# Patient Record
Sex: FEMALE | Race: BLACK OR AFRICAN AMERICAN | Employment: FULL TIME | URBAN - METROPOLITAN AREA
[De-identification: names, ages, dates, MRNs, and addresses within clinical notes are randomized per-mention and may not be internally consistent; named-entity substitution may affect disease eponyms.]

---

## 2017-03-08 ENCOUNTER — HOSPITAL ENCOUNTER (EMERGENCY)
Age: 47
Discharge: HOME | End: 2017-03-08
Attending: EMERGENCY MEDICINE

## 2017-03-08 VITALS
TEMPERATURE: 98.1 F | OXYGEN SATURATION: 100 % | RESPIRATION RATE: 16 BRPM | HEART RATE: 98 BPM | WEIGHT: 195 LBS | BODY MASS INDEX: 32.49 KG/M2 | HEIGHT: 65 IN | SYSTOLIC BLOOD PRESSURE: 140 MMHG | DIASTOLIC BLOOD PRESSURE: 90 MMHG

## 2017-03-08 DIAGNOSIS — B86 SCABIES: Primary | ICD-10-CM

## 2017-03-08 PROCEDURE — G0381 LEV 2 HOSP TYPE B ED VISIT: HCPCS

## 2017-03-08 RX ORDER — PERMETHRIN 50 MG/G
CREAM TOPICAL
Qty: 1 TUBE | Refills: 0 | Status: SHIPPED | OUTPATIENT
Start: 2017-03-08 | End: 2018-03-30 | Stop reason: ALTCHOICE

## 2017-03-08 ASSESSMENT — ENCOUNTER SYMPTOMS
NAUSEA: 0
COLOR CHANGE: 0
ABDOMINAL PAIN: 0
DIARRHEA: 0
VOMITING: 0
SHORTNESS OF BREATH: 0

## 2017-09-07 ENCOUNTER — HOSPITAL ENCOUNTER (OUTPATIENT)
Age: 47
Setting detail: SPECIMEN
Discharge: HOME | End: 2017-09-07
Payer: COMMERCIAL

## 2017-09-07 ENCOUNTER — OFFICE VISIT (OUTPATIENT)
Dept: INTERNAL MEDICINE | Age: 47
End: 2017-09-07
Payer: COMMERCIAL

## 2017-09-07 VITALS
HEIGHT: 65 IN | DIASTOLIC BLOOD PRESSURE: 69 MMHG | SYSTOLIC BLOOD PRESSURE: 128 MMHG | BODY MASS INDEX: 31.65 KG/M2 | WEIGHT: 190 LBS | HEART RATE: 59 BPM

## 2017-09-07 DIAGNOSIS — K21.9 GASTROESOPHAGEAL REFLUX DISEASE WITHOUT ESOPHAGITIS: ICD-10-CM

## 2017-09-07 DIAGNOSIS — J30.2 SEASONAL ALLERGIC RHINITIS, UNSPECIFIED ALLERGIC RHINITIS TRIGGER: ICD-10-CM

## 2017-09-07 DIAGNOSIS — D17.20 LIPOMA OF SHOULDER: ICD-10-CM

## 2017-09-07 DIAGNOSIS — E66.9 OBESITY (BMI 30-39.9): ICD-10-CM

## 2017-09-07 DIAGNOSIS — N91.2 AMENORRHEA: ICD-10-CM

## 2017-09-07 DIAGNOSIS — R14.0 ABDOMINAL BLOATING: Primary | ICD-10-CM

## 2017-09-07 LAB
ABSOLUTE EOS #: 0.2 K/UL (ref 0–0.4)
ABSOLUTE LYMPH #: 2.2 K/UL (ref 1–4.8)
ABSOLUTE MONO #: 0.5 K/UL (ref 0.1–1.2)
BASOPHILS # BLD: 1 %
BASOPHILS ABSOLUTE: 0 K/UL (ref 0–0.2)
DIFFERENTIAL TYPE: NORMAL
EOSINOPHILS RELATIVE PERCENT: 3 %
HCT VFR BLD CALC: 39.5 % (ref 36–46)
HEMOGLOBIN: 12.9 G/DL (ref 12–16)
LYMPHOCYTES # BLD: 31 %
MCH RBC QN AUTO: 29.4 PG (ref 26–34)
MCHC RBC AUTO-ENTMCNC: 32.8 G/DL (ref 31–37)
MCV RBC AUTO: 89.7 FL (ref 80–100)
MONOCYTES # BLD: 8 %
PDW BLD-RTO: 15.2 % (ref 12.5–15.4)
PLATELET # BLD: 267 K/UL (ref 140–450)
PLATELET ESTIMATE: NORMAL
PMV BLD AUTO: 9.4 FL (ref 6–12)
RBC # BLD: 4.4 M/UL (ref 4–5.2)
RBC # BLD: NORMAL 10*6/UL
SEG NEUTROPHILS: 57 %
SEGMENTED NEUTROPHILS ABSOLUTE COUNT: 4.1 K/UL (ref 1.8–7.7)
WBC # BLD: 7.1 K/UL (ref 3.5–11)
WBC # BLD: NORMAL 10*3/UL

## 2017-09-07 PROCEDURE — 36415 COLL VENOUS BLD VENIPUNCTURE: CPT

## 2017-09-07 PROCEDURE — 99213 OFFICE O/P EST LOW 20 MIN: CPT | Performed by: STUDENT IN AN ORGANIZED HEALTH CARE EDUCATION/TRAINING PROGRAM

## 2017-09-07 PROCEDURE — 85025 COMPLETE CBC W/AUTO DIFF WBC: CPT

## 2017-09-07 RX ORDER — PANTOPRAZOLE SODIUM 40 MG/1
40 TABLET, DELAYED RELEASE ORAL DAILY
Qty: 30 TABLET | Refills: 3 | Status: SHIPPED | OUTPATIENT
Start: 2017-09-07 | End: 2019-04-12 | Stop reason: ALTCHOICE

## 2017-09-07 ASSESSMENT — PATIENT HEALTH QUESTIONNAIRE - PHQ9
SUM OF ALL RESPONSES TO PHQ QUESTIONS 1-9: 0
SUM OF ALL RESPONSES TO PHQ9 QUESTIONS 1 & 2: 0
1. LITTLE INTEREST OR PLEASURE IN DOING THINGS: 0
1. LITTLE INTEREST OR PLEASURE IN DOING THINGS: 0
2. FEELING DOWN, DEPRESSED OR HOPELESS: 0
2. FEELING DOWN, DEPRESSED OR HOPELESS: 0
SUM OF ALL RESPONSES TO PHQ QUESTIONS 1-9: 0
SUM OF ALL RESPONSES TO PHQ9 QUESTIONS 1 & 2: 0

## 2019-04-08 ENCOUNTER — HOSPITAL ENCOUNTER (EMERGENCY)
Age: 49
Discharge: HOME | End: 2019-04-08
Attending: EMERGENCY MEDICINE

## 2019-04-08 VITALS
DIASTOLIC BLOOD PRESSURE: 79 MMHG | SYSTOLIC BLOOD PRESSURE: 146 MMHG | RESPIRATION RATE: 14 BRPM | OXYGEN SATURATION: 100 % | HEART RATE: 60 BPM | TEMPERATURE: 97.9 F | BODY MASS INDEX: 30.82 KG/M2 | HEIGHT: 65 IN | WEIGHT: 185 LBS

## 2019-04-08 DIAGNOSIS — L03.012 PARONYCHIA OF FINGER OF LEFT HAND: Primary | ICD-10-CM

## 2019-04-08 PROCEDURE — G0382 LEV 3 HOSP TYPE B ED VISIT: HCPCS

## 2019-04-08 RX ORDER — CEPHALEXIN 500 MG/1
500 CAPSULE ORAL 4 TIMES DAILY
Qty: 28 CAPSULE | Refills: 0 | Status: SHIPPED | OUTPATIENT
Start: 2019-04-08 | End: 2019-04-12 | Stop reason: ALTCHOICE

## 2019-04-08 ASSESSMENT — ENCOUNTER SYMPTOMS
VOMITING: 0
COUGH: 0
NAUSEA: 0
COLOR CHANGE: 1
WHEEZING: 0
ABDOMINAL PAIN: 0

## 2019-04-08 ASSESSMENT — PAIN DESCRIPTION - LOCATION: LOCATION: FINGER (COMMENT WHICH ONE)

## 2019-04-08 ASSESSMENT — PAIN SCALES - GENERAL
PAINLEVEL_OUTOF10: 10
PAINLEVEL_OUTOF10: 5

## 2019-04-08 ASSESSMENT — PAIN DESCRIPTION - PAIN TYPE: TYPE: ACUTE PAIN

## 2019-04-08 ASSESSMENT — PAIN DESCRIPTION - ORIENTATION: ORIENTATION: LEFT

## 2019-04-08 ASSESSMENT — PAIN DESCRIPTION - ONSET: ONSET: SUDDEN

## 2019-04-08 ASSESSMENT — PAIN DESCRIPTION - FREQUENCY: FREQUENCY: CONTINUOUS

## 2019-04-08 ASSESSMENT — PAIN DESCRIPTION - DESCRIPTORS: DESCRIPTORS: THROBBING

## 2019-04-08 NOTE — ED PROVIDER NOTES
[address] [facility]     Emergency Department     Faculty Attestation    I performed a history and physical examination of the patient and discussed management with the resident. I reviewed the residents note and agree with the documented findings and plan of care. Any areas of disagreement are noted on the chart. I was personally present for the key portions of any procedures. I have documented in the chart those procedures where I was not present during the key portions. I have reviewed the emergency nurses triage note. I agree with the chief complaint, past medical history, past surgical history, allergies, medications, social and family history as documented unless otherwise noted below. For Physician Assistant/ Nurse Practitioner cases/documentation I have personally evaluated this patient and have completed at least one if not all key elements of the E/M (history, physical exam, and MDM). Additional findings are as noted. Right-hand-dominant, paronychia left middle finger.       Aki Veronica MD  04/08/19 [id number]

## 2019-04-08 NOTE — ED PROVIDER NOTES
[address] [facility]  ED  Emergency Department Encounter  Advanced Practice Provider     Pt Name: Delia Ryan  MRN: [id number]  [place] 1970  Date of evaluation: 19  PCP:  Divya Talbot MD    [address] [place] [address]       Chief Complaint   Patient presents with    Hand Pain     pt c/o pain to left middle finger since last night pt states she thinks she got  a paint chip or splinter in it a few days ago        HISTORY OF PRESENT ILLNESS  (Location/Symptom, Timing/Onset,Context/Setting, Quality, Duration, Modifying Factors, Severity.)      Delia Ryan is a 50 y.o. female who presents with swelling around the nailbed of the left long finger. Patient states that this started 24 hours ago. She states that she might have had a hangnail or may have gotten a piece of a paint chip underneath her nail but she thought that she got it all out. She is right-hand dominant. She has full range of motion. She denies any numbness or tingling. She has no history of diabetes. She denies any drainage. PAST MEDICAL /SURGICAL / SOCIAL / FAMILY HISTORY      has a past medical history of Abnormal Pap smear, Allergic, Allergic rhinitis, Anxiety, Depression, and Obesity (BMI 35.0-39.9 without comorbidity). has a past surgical history that includes  section; Tubal ligation; Breast biopsy; and Gynecologic cryosurgery. Social History     Socioeconomic History    Marital status:      Spouse name: Not on file    Number of children: Not on file    Years of education: Not on file    Highest education level: Not on file   Occupational History    Not on file   Social Needs    Financial resource strain: Not on file    Food insecurity:     Worry: Not on file     Inability: Not on file    Transportation needs:     Medical: Not on file     Non-medical: Not on file   Tobacco Use    Smoking status: Never Smoker    Smokeless tobacco: Never Used   Substance and Sexual Activity    Alcohol use:  No Alcohol/week: 0.0 oz    Drug use: No    Sexual activity: Yes   Lifestyle    Physical activity:     Days per week: Not on file     Minutes per session: Not on file    Stress: Not on file   Relationships    Social connections:     Talks on phone: Not on file     Gets together: Not on file     Attends [employer] service: Not on file     Active member of club or organization: Not on file     Attends meetings of clubs or organizations: Not on file     Relationship status: Not on file    Intimate partner violence:     Fear of current or ex partner: Not on file     Emotionally abused: Not on file     Physically abused: Not on file     Forced sexual activity: Not on file   Other Topics Concern    Not on file   Social History Narrative    Not on file       Family History   Problem Relation Age of Onset    Cancer Maternal Aunt 52        colon    Cancer Maternal Grandmother 54        breast    Diabetes Mother     Breast Cancer Neg Hx     Colon Cancer Neg Hx     Eclampsia Neg Hx     Hypertension Neg Hx     Ovarian Cancer Neg Hx      Labor Neg Hx     Spont Abortions Neg Hx     Stroke Neg Hx        Allergies:  Patient has no known allergies. Home Medications:  Prior to Admission medications    Medication Sig Start Date End Date Taking? Authorizing Provider   cephALEXin (KEFLEX) 500 MG capsule Take 1 capsule by mouth 4 times daily for 7 days 4/8/19 4/15/19 Yes Hallie Velazquez PA-C   cetirizine (ZYRTEC) 1 MG/ML syrup TAKE ONE and a Half TEASPOONFUL BY MOUTH DAILY 17   Jenna Retana MD   pantoprazole (PROTONIX) 40 MG tablet Take 1 tablet by mouth daily 17   Jenna Retana MD       patient's medication list has been reviewed as entered by the nursing staff. REVIEW OF SYSTEMS    (2-9 systems for level 4, 10 or more for level 5)      Review of Systems   Constitutional: Negative for chills and fever. Respiratory: Negative for cough and wheezing. Cardiovascular: Negative for chest pain. Gastrointestinal: Negative for abdominal pain, nausea and vomiting. Musculoskeletal: Negative for arthralgias and myalgias. Skin: Positive for color change. Negative for wound. PHYSICAL EXAM  (up to 7 for level 4, 8 or more for level 5)      INITIAL VITALS:  height is 5' 5\" (1.651 m) and weight is 185 lb (83.9 kg). Her oral temperature is 97.9 °F (36.6 °C). Her blood pressure is 146/79 (abnormal) and her pulse is 60. Her respiration is 14 and oxygen saturation is 100%. Physical Exam   Constitutional: She is oriented to person, place, and time. She appears well-developed and well-nourished. HENT:   Head: Normocephalic and atraumatic. Right Ear: External ear normal.   Left Ear: External ear normal.   Eyes: Right eye exhibits no discharge. Left eye exhibits no discharge. No scleral icterus. Neck: No tracheal deviation present. Pulmonary/Chest: Effort normal. No stridor. No respiratory distress. Musculoskeletal: Normal range of motion. She exhibits no edema. On the long finger there is a fluctuant area that appears consistent with a paronychia. There is no fluctuance to the pad of the finger. Full range of motion of the finger. Radial pulses palpable. There is some mild erythema to the dorsum of the finger to the distal end. Neurological: She is alert and oriented to person, place, and time. Coordination normal.   Skin: Skin is warm and dry. She is not diaphoretic. Psychiatric: She has a normal mood and affect.  Her behavior is normal.         DIFFERENTIAL  DIAGNOSIS       Paronychia, felon    PLAN (LABS / [place] / EKG):  Orders Placed This Encounter   Procedures    INCISION AND DRAINAGE       MEDICATIONS ORDERED:  Orders Placed This Encounter   Medications    cephALEXin (KEFLEX) 500 MG capsule     Sig: Take 1 capsule by mouth 4 times daily for 7 days     Dispense:  28 capsule     Refill:  0       Controlled Substances Monitoring:      DIAGNOSTIC RESULTS / [address] but occasionally words are mis-transcribed.)        Marylu Bolanos PA-C  04/08/19 [id number]

## 2019-04-12 PROBLEM — E66.811 CLASS 1 OBESITY WITH BODY MASS INDEX (BMI) OF 32.0 TO 32.9 IN ADULT: Status: ACTIVE | Noted: 2019-04-12

## 2019-04-12 PROBLEM — E66.9 CLASS 1 OBESITY WITH BODY MASS INDEX (BMI) OF 32.0 TO 32.9 IN ADULT: Status: ACTIVE | Noted: 2019-04-12

## 2020-03-09 ENCOUNTER — OFFICE VISIT (OUTPATIENT)
Dept: INTERNAL MEDICINE | Age: 50
End: 2020-03-09
Payer: COMMERCIAL

## 2020-03-09 VITALS
HEART RATE: 83 BPM | BODY MASS INDEX: 35.16 KG/M2 | WEIGHT: 211 LBS | HEIGHT: 65 IN | DIASTOLIC BLOOD PRESSURE: 77 MMHG | SYSTOLIC BLOOD PRESSURE: 132 MMHG

## 2020-03-09 PROCEDURE — 99214 OFFICE O/P EST MOD 30 MIN: CPT | Performed by: NURSE PRACTITIONER

## 2020-03-09 PROCEDURE — 99211 OFF/OP EST MAY X REQ PHY/QHP: CPT | Performed by: NURSE PRACTITIONER

## 2020-03-09 RX ORDER — MELOXICAM 15 MG/1
15 TABLET ORAL DAILY
Qty: 30 TABLET | Refills: 1 | Status: SHIPPED | OUTPATIENT
Start: 2020-03-09 | End: 2020-10-13

## 2020-03-09 ASSESSMENT — PATIENT HEALTH QUESTIONNAIRE - PHQ9
SUM OF ALL RESPONSES TO PHQ QUESTIONS 1-9: 1
1. LITTLE INTEREST OR PLEASURE IN DOING THINGS: 0
SUM OF ALL RESPONSES TO PHQ QUESTIONS 1-9: 1
2. FEELING DOWN, DEPRESSED OR HOPELESS: 1
SUM OF ALL RESPONSES TO PHQ9 QUESTIONS 1 & 2: 1

## 2020-03-09 NOTE — PROGRESS NOTES
Subjective:      Patient ID: Dulce Reddy is a 52 y.o. female. Hand Pain    Incident onset: onset 6-7 months ago. There was no injury mechanism. The pain is present in the left fingers, left hand, right fingers and right hand. The quality of the pain is described as aching and cramping. The pain does not radiate. The pain is at a severity of 5/10. The pain is moderate. The pain has been intermittent since the incident. Pertinent negatives include no chest pain, muscle weakness, numbness or tingling. Exacerbated by: repetitive movement at work. She has tried NSAIDs for the symptoms. The treatment provided mild relief. Mental Health Problem   The primary symptoms include dysphoric mood. Primary symptoms comment: anxiety. The current episode started more than 1 month ago. This is a recurrent problem. The onset of the illness is precipitated by emotional stress. The degree of incapacity that she is experiencing as a consequence of her illness is mild. Additional symptoms of the illness include insomnia and fatigue. She does not admit to suicidal ideas. She does not have a plan to commit suicide. She does not contemplate harming herself. She has not already injured self. She does not contemplate injuring another person. She has not already  injured another person. Risk factors: history of anxiety and depression. Review of Systems   Constitutional: Positive for fatigue. Cardiovascular: Negative for chest pain. Neurological: Negative for tingling and numbness. Psychiatric/Behavioral: Positive for dysphoric mood. The patient has insomnia. All other systems reviewed and are negative. Objective:   Physical Exam  Vitals signs and nursing note reviewed. Constitutional:       Appearance: Normal appearance. HENT:      Head: Normocephalic and atraumatic.       Right Ear: Tympanic membrane, ear canal and external ear normal.      Left Ear: Tympanic membrane, ear canal and external ear normal. Nose: Nose normal.      Mouth/Throat:      Mouth: Mucous membranes are moist.      Pharynx: Oropharynx is clear. Eyes:      Extraocular Movements: Extraocular movements intact. Conjunctiva/sclera: Conjunctivae normal.      Pupils: Pupils are equal, round, and reactive to light. Neck:      Musculoskeletal: Normal range of motion and neck supple. Cardiovascular:      Rate and Rhythm: Normal rate and regular rhythm. Pulmonary:      Effort: Pulmonary effort is normal.      Breath sounds: Normal breath sounds. Abdominal:      General: Bowel sounds are normal.      Palpations: Abdomen is soft. Musculoskeletal: Normal range of motion. Comments: Radial pulses equal and strong. ROM of hands and fingers WDL bilaterally.  strength equal bilaterally. No edema, effusion or increased warmth of joints. Skin:     General: Skin is warm and dry. Neurological:      General: No focal deficit present. Mental Status: She is alert and oriented to person, place, and time. Psychiatric:         Mood and Affect: Mood normal.         Behavior: Behavior normal.         Thought Content: Thought content normal.         Judgment: Judgment normal.         Assessment/Plan:      1. Bilateral hand pain  - meloxicam (MOBIC) 15 MG tablet; Take 1 tablet by mouth daily  Dispense: 30 tablet; Refill: 1    2. Screening for hyperlipidemia  - Lipid Panel; Future    3. Encounter for screening for diabetes mellitus  - Glucose, Fasting; Future  - Hemoglobin A1C; Future    4. Screening for human immunodeficiency virus  - HIV Screen; Future    5. Anxiety and depression  - sertraline (ZOLOFT) 50 MG tablet; Take 1 tablet by mouth daily  Dispense: 30 tablet;  Refill: DEANGELO Coto - CNP

## 2020-06-03 ENCOUNTER — TELEPHONE (OUTPATIENT)
Dept: INTERNAL MEDICINE | Age: 50
End: 2020-06-03

## 2020-06-03 NOTE — TELEPHONE ENCOUNTER
Patient called in requesting that the days she has for her FMLA be extended & would also like to be prescribed Zyrtec for her allergies. Pt would like a call back from clinical staff in regards to this please advise thank you!

## 2020-06-04 NOTE — TELEPHONE ENCOUNTER
PC to patient - scheduled patient for 6/8/2020 to discuss FMLA. Advised patient to complete labs prior to Monday so that Maxwell Hendrix can review with her and discuss medication at time of appointment.

## 2020-06-08 ENCOUNTER — VIRTUAL VISIT (OUTPATIENT)
Dept: INTERNAL MEDICINE | Age: 50
End: 2020-06-08
Payer: COMMERCIAL

## 2020-06-08 PROCEDURE — 99213 OFFICE O/P EST LOW 20 MIN: CPT | Performed by: NURSE PRACTITIONER

## 2020-06-08 RX ORDER — BUSPIRONE HYDROCHLORIDE 5 MG/1
5 TABLET ORAL 2 TIMES DAILY
Qty: 60 TABLET | Refills: 0 | Status: SHIPPED | OUTPATIENT
Start: 2020-06-08 | End: 2020-07-08

## 2020-06-19 ENCOUNTER — TELEPHONE (OUTPATIENT)
Dept: INTERNAL MEDICINE | Age: 50
End: 2020-06-19

## 2020-07-24 ENCOUNTER — HOSPITAL ENCOUNTER (OUTPATIENT)
Age: 50
Setting detail: SPECIMEN
Discharge: HOME | End: 2020-07-24
Payer: COMMERCIAL

## 2020-07-24 LAB
CHOLESTEROL/HDL RATIO: 2.6
CHOLESTEROL: 172 MG/DL
ESTIMATED AVERAGE GLUCOSE: 114 MG/DL
GLUCOSE FASTING: 79 MG/DL (ref 70–99)
HBA1C MFR BLD: 5.6 % (ref 4–6)
HDLC SERPL-MCNC: 66 MG/DL
HIV AG/AB: NONREACTIVE
LDL CHOLESTEROL: 97 MG/DL (ref 0–130)
TRIGL SERPL-MCNC: 44 MG/DL
VLDLC SERPL CALC-MCNC: NORMAL MG/DL (ref 1–30)

## 2020-07-27 LAB
2000687N OAK TREE IGE: <0.34 KU/L (ref 0–0.34)
ALLERGEN BERMUDA GRASS IGE: 0.63 KU/L (ref 0–0.34)
ALLERGEN BIRCH IGE: <0.34 KU/L (ref 0–0.34)
ALLERGEN COW MILK IGE: <0.34 KU/L (ref 0–0.34)
ALLERGEN DOG DANDER IGE: 0.38 KU/L (ref 0–0.34)
ALLERGEN GERMAN COCKROACH IGE: <0.34 KU/L (ref 0–0.34)
ALLERGEN HORMODENDRUM IGE: <0.34 KUL/L (ref 0–0.34)
ALLERGEN MOUSE EPITHELIA IGE: <0.34 KU/L (ref 0–0.34)
ALLERGEN PEANUT (F13) IGE: <0.34 KU/L (ref 0–0.34)
ALLERGEN PECAN TREE IGE: <0.34 KU/L (ref 0–0.34)
ALLERGEN PIGWEED ROUGH IGE: <0.34 KU/L (ref 0–0.34)
ALLERGEN SHEEP SORREL (W18) IGE: <0.34 KU/L (ref 0–0.34)
ALLERGEN TREE SYCAMORE: <0.34 KU/L (ref 0–0.34)
ALLERGEN WALNUT TREE IGE: <0.34 KU/L (ref 0–0.34)
ALLERGEN WHITE MULBERRY TREE, IGE: <0.34 KU/L (ref 0–0.34)
ALLERGEN, TREE, WHITE ASH IGE: <0.34 KU/L (ref 0–0.34)
ALTERNARIA ALTERNATA: <0.34 KU/L (ref 0–0.34)
ASPERGILLUS FUMIGATUS: <0.34 KU/L (ref 0–0.34)
CAT DANDER ANTIBODY: 10.8 KU/L (ref 0–0.34)
COTTONWOOD TREE: <0.34 KU/L (ref 0–0.34)
D. FARINAE: <0.34 KU/L (ref 0–0.34)
D. PTERONYSSINUS: <0.34 KU/L (ref 0–0.34)
ELM TREE: <0.34 KU/L (ref 0–0.34)
IGE: 75 IU/ML
MAPLE/BOXELDER TREE: <0.34 KU/L (ref 0–0.34)
MOUNTAIN CEDAR TREE: <0.34 KU/L (ref 0–0.34)
MUCOR RACEMOSUS: <0.34 KU/L (ref 0–0.34)
P. NOTATUM: <0.34 KU/L (ref 0–0.34)
RUSSIAN THISTLE: <0.34 KU/L (ref 0–0.34)
SHORT RAGWD(A ARTEMIS.) IGE: <0.34 KU/L (ref 0–0.34)
TIMOTHY GRASS: 4.74 KU/L (ref 0–0.34)

## 2020-07-31 ENCOUNTER — TELEPHONE (OUTPATIENT)
Dept: PEDIATRICS | Age: 50
End: 2020-07-31

## 2020-08-03 RX ORDER — CETIRIZINE HYDROCHLORIDE 10 MG/1
10 TABLET ORAL DAILY
Qty: 30 TABLET | Refills: 1 | Status: SHIPPED | OUTPATIENT
Start: 2020-08-03 | End: 2021-01-05 | Stop reason: SDUPTHER

## 2020-09-30 ENCOUNTER — VIRTUAL VISIT (OUTPATIENT)
Dept: INTERNAL MEDICINE | Age: 50
End: 2020-09-30
Payer: COMMERCIAL

## 2020-09-30 PROCEDURE — 99442 PR PHYS/QHP TELEPHONE EVALUATION 11-20 MIN: CPT | Performed by: INTERNAL MEDICINE

## 2020-09-30 NOTE — PROGRESS NOTES
Patient initiated a phone visit to discuss starting on weight loss medication. She has tried Adipex in the past and would like to go back on it. She has no other underlying hypertension or coronary artery disease. Patient advised to come in for a blood pressure check and counseling regarding Adipex. Labs reviewed. Patient also has not had a Pap smear or colonoscopy. We will discuss all this issues at the next visit. Attending Physician Statement  I have discussed the care of Adithya Castro, including pertinent history and exam findings,  with the resident. I have reviewed the key elements of all parts of the encounter with the resident. I agree with the assessment, plan and orders as documented by the resident.   (GE Modifier)

## 2020-09-30 NOTE — PATIENT INSTRUCTIONS
Appointment for pap and adipex counceling on 10/21/2020     referral to [facility] gastroenterology [phone]      tv

## 2020-10-13 ENCOUNTER — OFFICE VISIT (OUTPATIENT)
Dept: INTERNAL MEDICINE | Age: 50
End: 2020-10-13
Payer: COMMERCIAL

## 2020-10-13 ENCOUNTER — HOSPITAL ENCOUNTER (OUTPATIENT)
Age: 50
Setting detail: SPECIMEN
Discharge: HOME | End: 2020-10-13
Payer: COMMERCIAL

## 2020-10-13 VITALS
HEIGHT: 65 IN | SYSTOLIC BLOOD PRESSURE: 162 MMHG | HEART RATE: 83 BPM | DIASTOLIC BLOOD PRESSURE: 99 MMHG | WEIGHT: 214 LBS | BODY MASS INDEX: 35.65 KG/M2

## 2020-10-13 LAB
HBA1C MFR BLD: 5.6 %
TSH SERPL DL<=0.05 MIU/L-ACNC: 1.11 MIU/L (ref 0.3–5)

## 2020-10-13 PROCEDURE — 83036 HEMOGLOBIN GLYCOSYLATED A1C: CPT | Performed by: STUDENT IN AN ORGANIZED HEALTH CARE EDUCATION/TRAINING PROGRAM

## 2020-10-13 PROCEDURE — 99211 OFF/OP EST MAY X REQ PHY/QHP: CPT | Performed by: INTERNAL MEDICINE

## 2020-10-13 PROCEDURE — 99213 OFFICE O/P EST LOW 20 MIN: CPT | Performed by: STUDENT IN AN ORGANIZED HEALTH CARE EDUCATION/TRAINING PROGRAM

## 2020-10-13 RX ORDER — MEDICAL SUPPLY, MISCELLANEOUS
1 EACH MISCELLANEOUS DAILY
Qty: 1 EACH | Refills: 0 | Status: SHIPPED | OUTPATIENT
Start: 2020-10-13 | End: 2021-09-28

## 2020-10-13 RX ORDER — PHENTERMINE AND TOPIRAMATE 3.75; 23 MG/1; MG/1
1 CAPSULE, EXTENDED RELEASE ORAL DAILY
Qty: 14 CAPSULE | Refills: 0 | Status: CANCELLED | OUTPATIENT
Start: 2020-10-13 | End: 2020-10-27

## 2020-10-13 RX ORDER — CETIRIZINE HYDROCHLORIDE 10 MG/1
10 TABLET ORAL DAILY
Qty: 30 TABLET | Refills: 1 | Status: CANCELLED | OUTPATIENT
Start: 2020-10-13

## 2020-10-13 RX ORDER — BUSPIRONE HYDROCHLORIDE 5 MG/1
5 TABLET ORAL 3 TIMES DAILY
Qty: 90 TABLET | Refills: 0 | Status: CANCELLED | OUTPATIENT
Start: 2020-10-13 | End: 2020-11-12

## 2020-10-13 NOTE — PATIENT INSTRUCTIONS
Printed cuff for bp cuff given to pt    Labs given to patient, they will have them done before their next visit. Referral to [facility]'s out pt nutrition [phone]    Follow-up appointment scheduled for   10/21/2020   , AVS given to patient.       tv

## 2020-10-13 NOTE — PROGRESS NOTES
MEDICATIONS:      Current Outpatient Medications on File Prior to Visit   Medication Sig Dispense Refill    cetirizine (ZYRTEC) 10 MG tablet Take 1 tablet by mouth daily 30 tablet 1     No current facility-administered medications on file prior to visit. SOCIAL HISTORY    Reviewed and no change from previous record. [employer]  reports that she has never smoked. She has never used smokeless tobacco.    FAMILY HISTORY:    Reviewed and No change from previous visit    HEALTH MAINTENANCE DUE:      Health Maintenance Due   Topic Date Due    Cervical cancer screen  07/22/2018    Breast cancer screen  09/11/2020    Colon cancer screen colonoscopy  09/11/2020       REVIEW OF SYSTEMS:    12 point review of symptoms completed and found to be normal except noted in the HPI    · Constitutional: Negative for Fever, chills  · Eyes: Negative for visual changes, diplopia  · ENT: Negative for mouth sores, sore throat. · Cardiovascular: Negative for lightheadedness ,chest pain, palpitations   · Respiratory:Negative for Shortness of breath,cough or wheezing. · Gastrointestinal: Negative for nausea/vomiting, change in bowel habits, abdominal pain  · Genitourinary:Negative for change in bladder habits, dysuria, hematuria. · Musculoskeletal: Negative for joint pain   · Neurological: Negative for headache, change in muscle strength numbness/tingling       PHYSICAL EXAM:      Vitals:    10/13/20 0954 10/13/20 1022   BP: (!) 168/87 (!) 162/99   Site: Right Upper Arm Left Upper Arm   Position: Sitting Sitting   Cuff Size: Medium Adult Large Adult   Pulse: 87 83   Weight: 214 lb (97.1 kg)    Height: 5' 5\" (1.651 m)      Body mass index is 35.61 kg/m².     BP Readings from Last 3 Encounters:   10/13/20 (!) 162/99   03/09/20 132/77   04/12/19 130/78        Wt Readings from Last 3 Encounters:   10/13/20 214 lb (97.1 kg)   03/09/20 211 lb (95.7 kg)   04/12/19 193 lb 3 oz (87.6 kg)           · General appearance: awake, alert, cooperative  · HEENT: Head: Normocephalic, no lesions, without obvious abnormality. · Lungs: clear to auscultation bilaterally  · Heart: regular rate and rhythm, S1, S2 normal, no murmur  · Abdomen: soft, non-tender; bowel sounds normal; no masses,  no organomegaly  · Extremities: extremities normal, atraumatic, no cyanosis or edema  · Neurological:  Awake, alert, oriented to name, place and time. Cranial nerves II-XII are grossly intact. Reflexes normal and symmetric. Sensation grossly normal  · Eye no icterus no redness    LABORATORY FINDINGS:    CBC:  Lab Results   Component Value Date    WBC 7.1 09/07/2017    HGB 12.9 09/07/2017     09/07/2017     BMP:    Lab Results   Component Value Date     09/25/2014    K 3.6 09/25/2014     09/25/2014    CO2 24 09/25/2014    BUN 12 09/25/2014    CREATININE 0.68 09/25/2014    GLUCOSE 93 09/25/2014     HEMOGLOBIN A1C:   Lab Results   Component Value Date    LABA1C 5.6 07/24/2020     MICROALBUMIN URINE: No results found for: MICROALBUR  FASTING LIPID PANEL:  Lab Results   Component Value Date    CHOL 172 07/24/2020    HDL 66 07/24/2020    TRIG 44 07/24/2020     Lab Results   Component Value Date    LDLCHOLESTEROL 97 07/24/2020       LIVER PROFILE:  Lab Results   Component Value Date    ALT 13 09/25/2014    AST 19 09/25/2014    PROT 7.1 09/25/2014    BILITOT 0.30 09/25/2014    LABALBU 3.7 09/25/2014      THYROID FUNCTION: No results found for: TSH   URINE ANALYSIS: No results found for: LABURIN  ASSESSMENT AND PLAN:    1. Weight gain  Follow-up TSH    2. Class 2 obesity due to excess calories without serious comorbidity with body mass index (BMI) of 35.0 to 35.9 in adult    Patient counseled about lifestyle modification and dietary change. Will refer the patient to dietitian. Goal weight loss of 2 to 4 pounds in the next 1 month. If patient adheres to behavioral and lifestyle changes then will prescribe phentermine with the following dosing regimen. Phentermine topiramate to be prescribed in 1 month from now provided patient shows weight loss of 2 to 4 pounds from the current weight. Combination 3.75-23 for 14 days daily. We will schedule an appointment in 2 weeks and then increase the dose to 7.5 mg / 46 mg p.o. daily for 12 weeks. Counseled on dietary modification with low-fat, high-fiber and vegetable diet and regular exercise of at least 30 minutes daily for 5 days a week. Target weight loss: More than 5% of total body weight at 3 months. Health Maintenance      FOLLOW UP AND INSTRUCTIONS:   No follow-ups on file. 1. Sona Neal received counseling on the following healthy behaviors: nutrition, exercise and medication adherence    2. Reviewed prior labs and health maintenance. 3. Discussed use, benefit, and side effects of prescribed medications. Barriers to medication compliance addressed. All patient questions answered. Pt voiced understanding.      4. Patient given educational materials - see patient instructions      Jamarcus Rueda MD  PGY-2, Internal Medicine Resident  [address]  10/13/2020 10:23 AM

## 2020-10-13 NOTE — PROGRESS NOTES
Attending Physician Statement  I have discussed the care of David Santiago including pertinent history and exam findings,  with the resident. I have reviewed the key elements of all parts of the encounter with the resident. I agree with the assessment, plan and orders as documented by the resident. (GE Modifier)    Class 2 obesity with possible hypertension ,. Recently attained menopause  She is requesting phentermine. She has been on this medication on and off for the last 3 years . No net weight loss in 3 years , started at 225 lbs and now is back to 215 pounds   Her underlying anxiety disorder, hypertension and menopausal state along with non compliance with  Life style modification makes for relative contraindication for this medication   She is advised to follow the weight loss plan of low calorie intake of <1300 kcal, diet referral and at least 2 hours of vigorous physical activity per week   rtc in 1 month to see a target weight loss of 2-4 lbs , if she is able to achieve that and shows consistency with diet , will consider adding medication.   Monitor bp at home ,she is very anxious today and her bp is high     MD LIBAN Rodriguez  Attending Physician, [address] [place] [address], Internal Medicine Residency Program  [address] [place] [address]  10/13/2020, 10:41 AM

## 2020-10-28 ENCOUNTER — TELEPHONE (OUTPATIENT)
Dept: GASTROENTEROLOGY | Age: 50
End: 2020-10-28

## 2020-11-02 ENCOUNTER — TELEPHONE (OUTPATIENT)
Dept: INTERNAL MEDICINE | Age: 50
End: 2020-11-02

## 2020-11-02 NOTE — LETTER
[address] [address]  Phone: [phone]  Fax: [phone], APRN - CNP        November 2, 2020    [place] [address]      Dear Sharla Bal: We are sending this letter because your PCP ordered [facility] for you to have done at your last visit here and they have not yet been completed. If you can please come to our office on the 2nd floor to  your orders to have them compelted. If you do not have a follow-up appointment scheduled you can either contact the office to make an appointment with us or you can make one when you come in to pick-up your orders. If you have any questions or concerns, please don't hesitate to call.     Sincerely,        DEANGELO Mei - CNP

## 2020-11-30 ENCOUNTER — VIRTUAL VISIT (OUTPATIENT)
Dept: INTERNAL MEDICINE | Age: 50
End: 2020-11-30
Payer: COMMERCIAL

## 2020-11-30 PROCEDURE — 99214 OFFICE O/P EST MOD 30 MIN: CPT | Performed by: NURSE PRACTITIONER

## 2020-11-30 RX ORDER — BUSPIRONE HYDROCHLORIDE 5 MG/1
5 TABLET ORAL 2 TIMES DAILY
COMMUNITY
Start: 2020-10-13 | End: 2021-01-05 | Stop reason: SDUPTHER

## 2020-11-30 NOTE — PROGRESS NOTES
2020    TELEHEALTH EVALUATION -- Audio/Visual (During [facility]-91 public health emergency)    HPI:    Marylin Spain (:  1970) has requested an audio/video evaluation for the following concern(s):    Hypertension   This is a chronic problem. The current episode started more than 1 year ago. The problem is unchanged. The problem is controlled. Pertinent negatives include no anxiety, blurred vision, chest pain, headaches, malaise/fatigue, neck pain, orthopnea, palpitations, peripheral edema, PND, shortness of breath or sweats. There are no associated agents to hypertension. Risk factors for coronary artery disease include stress and sedentary lifestyle. Treatments tried: has been on medications in the past, none now. Improvement on treatment: unknown. Compliance problems include diet and exercise. There is no history of angina, kidney disease, CAD/MI, CVA, heart failure, left ventricular hypertrophy, PVD or retinopathy. Concerned about weight gain. Reports diet and exercise are not working. She took Adipex in the remote past and it was helpful. She would like to try it again. Explained BP needs to be controlled and ECG/echo need to be completed first.  Patient is agreeable. Review of Systems   Constitutional: Negative for malaise/fatigue. Eyes: Negative for blurred vision. Respiratory: Negative for shortness of breath. Cardiovascular: Negative for chest pain, palpitations, orthopnea and PND. Musculoskeletal: Negative for neck pain. Neurological: Negative for headaches. All other systems reviewed and are negative. Prior to Visit Medications    Medication Sig Taking?  Authorizing Provider   Blood Pressure Monitoring (B-D ASSURE BPM/AUTO ARM CUFF) MISC 1 Device by Does not apply route daily Yes Rissa Stokes MD   cetirizine (ZYRTEC) 10 MG tablet Take 1 tablet by mouth daily Yes DEANGELO Oconnor - CNP   busPIRone (BUSPAR) 5 MG tablet Take 5 mg by mouth 2 times daily Historical Provider, MD       Social History     Tobacco Use    Smoking status: Never Smoker    Smokeless tobacco: Never Used   Substance Use Topics    Alcohol use: Yes     Alcohol/week: 1.0 standard drinks     Types: 1 Shots of liquor per week     Comment: occassional    Drug use: No        No Known Allergies  Past Medical History:   Diagnosis Date    Abnormal Pap smear     1992    Allergic     Allergic rhinitis     Anxiety     Depression     Obesity (BMI 35.0-39.9 without comorbidity) 6/1/2015     Current Outpatient Medications   Medication Sig Dispense Refill    Blood Pressure Monitoring (B-D ASSURE BPM/AUTO ARM CUFF) MISC 1 Device by Does not apply route daily 1 each 0    cetirizine (ZYRTEC) 10 MG tablet Take 1 tablet by mouth daily 30 tablet 1    busPIRone (BUSPAR) 5 MG tablet Take 5 mg by mouth 2 times daily       No current facility-administered medications for this visit. PHYSICAL EXAMINATION:  [ INSTRUCTIONS:  \"[x]\" Indicates a positive item  \"[]\" Indicates a negative item  -- DELETE ALL ITEMS NOT EXAMINED]  Vital Signs: (As obtained by patient/caregiver or practitioner observation)    Blood pressure-  Heart rate-    Respiratory rate-    Temperature-  Pulse oximetry-     Constitutional: [x] Appears well-developed and well-nourished [x] No apparent distress      [] Abnormal-   Mental status  [x] Alert and awake  [x] Oriented to person/place/time [x]Able to follow commands      Eyes:  EOM    []  Normal  [] Abnormal-  Sclera  [x]  Normal  [] Abnormal -         Discharge [x]  None visible  [] Abnormal -    HENT:   [x] Normocephalic, atraumatic.   [] Abnormal   [] Mouth/Throat: Mucous membranes are moist.     External Ears [] Normal  [] Abnormal-     Neck: [x] No visualized mass     Pulmonary/Chest: [x] Respiratory effort normal.  [x] No visualized signs of difficulty breathing or respiratory distress        [] Abnormal-      Musculoskeletal:   [] Normal gait with no signs of ataxia [x] Normal range of motion of neck        [] Abnormal-       Neurological:        [x] No Facial Asymmetry (Cranial nerve 7 motor function) (limited exam to video visit)          [x] No gaze palsy        [] Abnormal-         Skin:        [] No significant exanthematous lesions or discoloration noted on facial skin         [] Abnormal-            Psychiatric:       [x] Normal Affect [x] No Hallucinations        [] Abnormal-     Other pertinent observable physical exam findings-     ASSESSMENT/PLAN:  1. Hypertension, unspecified type  - check BP and log daily  - EKG 12 lead; Future  - ECHO Complete 2D W Doppler W Color; Future    2. Weight gain  - consider Adipex if BP controlled and cardiac studies normal    3. Obesity (BMI 30-39.9)  - encouraged healthy diet and increased exercise      Return in about 4 weeks (around 12/28/2020). Elpidio Carrillo is a 48 y.o. female being evaluated by a Virtual Visit (video visit) encounter to address concerns as mentioned above. A caregiver was present when appropriate. Due to this being a TeleHealth encounter (During [facility]- public health emergency), evaluation of the following organ systems was limited: Vitals/Constitutional/EENT/Resp/CV/GI//MS/Neuro/Skin/Heme-Lymph-Imm. Pursuant to the emergency declaration under the [address] [place] [address] authority and the [employer] and [employer] Act, this Virtual Visit was conducted with patient's (and/or legal guardian's) consent, to reduce the patient's risk of exposure to COVID-19 and provide necessary medical care. The patient (and/or legal guardian) has also been advised to contact this office for worsening conditions or problems, and seek emergency medical treatment and/or call 911 if deemed necessary.      Patient identification was verified at the start of the visit: Yes    Total time spent on this encounter: Not billed by time    Services were provided through a video synchronous discussion virtually to substitute for in-person clinic visit. Patient and provider were located at their individual homes. --DEANGELO Lance CNP on 12/4/2020 at 11:03 AM    An electronic signature was used to authenticate this note.

## 2020-11-30 NOTE — PATIENT INSTRUCTIONS
Return To Clinic 1/5/2021 for 1 month follow up via video to review blood pressure logs. The medication list included in this document is our record of what you are currently taking, including any changes that were made at today's visit. If you find any differences when compared to your medications at home, or have any questions that were not answered at your visit, please contact the office. It is very important for your care that you keep your appointment. If for some reason you are unable to keep your appointment, it is equally important that you call our office at [phone] to cancel your appointment and reschedule. Failure to do so may result in your termination from our practice. EKG INSTRUCTIONS    Your doctor has ordered an EKG for you. This can be done at any [facility] without an appointment. [address] [facility]. [address] [place] [address]    Report to the Admitting Department, located on the main floor of the [facility] behind the information desk. Please remember to bring your EKG order with you. FOR THIS TESTING, YOU DO NOT NEED AN APPOINTMENT. TESTING INSTRUCTIONS    Your doctor has ordered a/an Echocardiogram for you, this will be done at any [facility] location of your choice    You will be called by the Scheduling Department to schedule your testing. If you do not hear from them within a week, please call them at [phone] to schedule the appointment. On the day of your appointment, please report to the Admitting Department, located on the main floor of the [facility] behind the information desk. If you cannot keep this appointment, please call [phone] to cancel and reschedule your appointment.     Lab Orders reprinted and mailed to patient along with scheduling phone number and instructions.     CALLY Bonilla

## 2020-12-04 ASSESSMENT — ENCOUNTER SYMPTOMS
BLURRED VISION: 0
SHORTNESS OF BREATH: 0
ORTHOPNEA: 0

## 2020-12-14 ENCOUNTER — HOSPITAL ENCOUNTER (OUTPATIENT)
Dept: MAMMOGRAPHY | Age: 50
Discharge: HOME | End: 2020-12-16
Payer: COMMERCIAL

## 2020-12-14 PROCEDURE — 77063 BREAST TOMOSYNTHESIS BI: CPT

## 2020-12-15 NOTE — TELEPHONE ENCOUNTER
Pt left msg on ofc vm 12/14/20 @ 3:41pm wanting to sched colon proc.   She states whatever day is good for her to just sched proc and leave information on her vm due to she will probably be working & rather not play phone tag.  [phone]

## 2020-12-30 RX ORDER — BISACODYL 5 MG
TABLET, DELAYED RELEASE (ENTERIC COATED) ORAL
Qty: 4 TABLET | Refills: 0 | Status: SHIPPED | OUTPATIENT
Start: 2020-12-30 | End: 2021-09-28

## 2020-12-30 RX ORDER — POLYETHYLENE GLYCOL 3350 17 G/17G
POWDER, FOR SOLUTION ORAL
Qty: 238 G | Refills: 0 | Status: SHIPPED | OUTPATIENT
Start: 2020-12-30 | End: 2021-09-28

## 2020-12-30 NOTE — TELEPHONE ENCOUNTER
Writer returned patient's call. Reviewed NP colon screen qx and was able to schedule colon screen without OV prior. Procedure scheduled for 1/25/21 at [facility] 9:00am.  Covid test scheduled 1/21/21 3:50pm ST[facility]. Bowel prep instructions reviewed with patient and advised her she will need a . Patient voiced understanding. Bowel prep instructions will be mailed to [facility]. [email or web address].

## 2021-01-04 ENCOUNTER — HOSPITAL ENCOUNTER (OUTPATIENT)
Age: 51
Discharge: HOME | End: 2021-01-04
Payer: COMMERCIAL

## 2021-01-04 PROCEDURE — 93005 ELECTROCARDIOGRAM TRACING: CPT | Performed by: NURSE PRACTITIONER

## 2021-01-05 ENCOUNTER — CARE COORDINATION (OUTPATIENT)
Dept: CARE COORDINATION | Age: 51
End: 2021-01-05

## 2021-01-05 ENCOUNTER — VIRTUAL VISIT (OUTPATIENT)
Dept: INTERNAL MEDICINE | Age: 51
End: 2021-01-05
Payer: COMMERCIAL

## 2021-01-05 DIAGNOSIS — I10 HYPERTENSION, UNSPECIFIED TYPE: Primary | ICD-10-CM

## 2021-01-05 DIAGNOSIS — J30.81 ALLERGIC RHINITIS DUE TO ANIMAL DANDER: ICD-10-CM

## 2021-01-05 DIAGNOSIS — J30.1 SEASONAL ALLERGIC RHINITIS DUE TO POLLEN: ICD-10-CM

## 2021-01-05 DIAGNOSIS — R63.5 WEIGHT GAIN: ICD-10-CM

## 2021-01-05 DIAGNOSIS — Z13.31 POSITIVE DEPRESSION SCREENING: ICD-10-CM

## 2021-01-05 LAB
EKG ATRIAL RATE: 59 BPM
EKG P AXIS: 40 DEGREES
EKG P-R INTERVAL: 176 MS
EKG Q-T INTERVAL: 474 MS
EKG QRS DURATION: 90 MS
EKG QTC CALCULATION (BAZETT): 469 MS
EKG R AXIS: 13 DEGREES
EKG T AXIS: 15 DEGREES
EKG VENTRICULAR RATE: 59 BPM

## 2021-01-05 PROCEDURE — 99213 OFFICE O/P EST LOW 20 MIN: CPT | Performed by: NURSE PRACTITIONER

## 2021-01-05 PROCEDURE — G8431 POS CLIN DEPRES SCRN F/U DOC: HCPCS | Performed by: NURSE PRACTITIONER

## 2021-01-05 RX ORDER — BUSPIRONE HYDROCHLORIDE 5 MG/1
5 TABLET ORAL 2 TIMES DAILY
Qty: 60 TABLET | Refills: 3 | Status: SHIPPED | OUTPATIENT
Start: 2021-01-05

## 2021-01-05 RX ORDER — CETIRIZINE HYDROCHLORIDE 10 MG/1
10 TABLET ORAL DAILY
Qty: 30 TABLET | Refills: 1 | Status: SHIPPED | OUTPATIENT
Start: 2021-01-05

## 2021-01-05 SDOH — SOCIAL STABILITY: SOCIAL NETWORK
DO YOU BELONG TO ANY CLUBS OR ORGANIZATIONS SUCH AS CHURCH GROUPS UNIONS, FRATERNAL OR ATHLETIC GROUPS, OR SCHOOL GROUPS?: NO

## 2021-01-05 SDOH — ECONOMIC STABILITY: TRANSPORTATION INSECURITY
IN THE PAST 12 MONTHS, HAS LACK OF TRANSPORTATION KEPT YOU FROM MEETINGS, WORK, OR FROM GETTING THINGS NEEDED FOR DAILY LIVING?: NO

## 2021-01-05 SDOH — ECONOMIC STABILITY: FOOD INSECURITY: WITHIN THE PAST 12 MONTHS, THE FOOD YOU BOUGHT JUST DIDN'T LAST AND YOU DIDN'T HAVE MONEY TO GET MORE.: SOMETIMES TRUE

## 2021-01-05 SDOH — ECONOMIC STABILITY: FOOD INSECURITY: WITHIN THE PAST 12 MONTHS, YOU WORRIED THAT YOUR FOOD WOULD RUN OUT BEFORE YOU GOT MONEY TO BUY MORE.: SOMETIMES TRUE

## 2021-01-05 SDOH — SOCIAL STABILITY: SOCIAL NETWORK: HOW OFTEN DO YOU ATTEND CHURCH OR RELIGIOUS SERVICES?: NEVER

## 2021-01-05 SDOH — SOCIAL STABILITY: SOCIAL NETWORK
IN A TYPICAL WEEK, HOW MANY TIMES DO YOU TALK ON THE PHONE WITH FAMILY, FRIENDS, OR NEIGHBORS?: MORE THAN THREE TIMES A WEEK

## 2021-01-05 SDOH — SOCIAL STABILITY: SOCIAL NETWORK: ARE YOU MARRIED, WIDOWED, DIVORCED, SEPARATED, NEVER MARRIED, OR LIVING WITH A PARTNER?: DIVORCED

## 2021-01-05 SDOH — HEALTH STABILITY: MENTAL HEALTH: HOW MANY STANDARD DRINKS CONTAINING ALCOHOL DO YOU HAVE ON A TYPICAL DAY?: 1 OR 2

## 2021-01-05 SDOH — ECONOMIC STABILITY: HOUSING INSECURITY: IN THE LAST 12 MONTHS, HOW MANY PLACES HAVE YOU LIVED?: 1

## 2021-01-05 SDOH — HEALTH STABILITY: MENTAL HEALTH: HOW OFTEN DO YOU HAVE A DRINK CONTAINING ALCOHOL?: MONTHLY OR LESS

## 2021-01-05 SDOH — HEALTH STABILITY: PHYSICAL HEALTH: ON AVERAGE, HOW MANY MINUTES DO YOU ENGAGE IN EXERCISE AT THIS LEVEL?: 60 MIN

## 2021-01-05 SDOH — ECONOMIC STABILITY: INCOME INSECURITY: HOW HARD IS IT FOR YOU TO PAY FOR THE VERY BASICS LIKE FOOD, HOUSING, MEDICAL CARE, AND HEATING?: NOT VERY HARD

## 2021-01-05 ASSESSMENT — PATIENT HEALTH QUESTIONNAIRE - PHQ9
4. FEELING TIRED OR HAVING LITTLE ENERGY: 0
7. TROUBLE CONCENTRATING ON THINGS, SUCH AS READING THE NEWSPAPER OR WATCHING TELEVISION: 2
2. FEELING DOWN, DEPRESSED OR HOPELESS: 3
SUM OF ALL RESPONSES TO PHQ QUESTIONS 1-9: 15
10. IF YOU CHECKED OFF ANY PROBLEMS, HOW DIFFICULT HAVE THESE PROBLEMS MADE IT FOR YOU TO DO YOUR WORK, TAKE CARE OF THINGS AT HOME, OR GET ALONG WITH OTHER PEOPLE: 1
8. MOVING OR SPEAKING SO SLOWLY THAT OTHER PEOPLE COULD HAVE NOTICED. OR THE OPPOSITE, BEING SO FIGETY OR RESTLESS THAT YOU HAVE BEEN MOVING AROUND A LOT MORE THAN USUAL: 2
SUM OF ALL RESPONSES TO PHQ QUESTIONS 1-9: 15
3. TROUBLE FALLING OR STAYING ASLEEP: 2
SUM OF ALL RESPONSES TO PHQ9 QUESTIONS 1 & 2: 4

## 2021-01-05 ASSESSMENT — COLUMBIA-SUICIDE SEVERITY RATING SCALE - C-SSRS
2. HAVE YOU ACTUALLY HAD ANY THOUGHTS OF KILLING YOURSELF?: NO
6. HAVE YOU EVER DONE ANYTHING, STARTED TO DO ANYTHING, OR PREPARED TO DO ANYTHING TO END YOUR LIFE?: NO

## 2021-01-05 NOTE — TELEPHONE ENCOUNTER
Refill request for [facility]. If appropriate please send medication(s) to patients pharmacy. Next appt: 1/5/2021      Health Maintenance   Topic Date Due    Hepatitis C screen  1970    Cervical cancer screen  07/22/2018    Colon cancer screen colonoscopy  09/11/2020    Flu vaccine (1) 10/13/2021 (Originally 9/1/2020)    Shingles Vaccine (1 of 2) 10/13/2021 (Originally 9/11/2020)    Breast cancer screen  12/14/2022    Diabetes screen  10/13/2023    DTaP/Tdap/Td vaccine (2 - Td) 09/08/2024    Lipid screen  07/24/2025    HIV screen  Completed    Hepatitis A vaccine  Aged Out    Hepatitis B vaccine  Aged Out    Hib vaccine  Aged Out    Meningococcal (ACWY) vaccine  Aged Out    Pneumococcal 0-64 years Vaccine  Aged Out       Hemoglobin A1C (%)   Date Value   10/13/2020 5.6   07/24/2020 5.6             ( goal A1C is < 7)   No results found for: LABMICR  LDL Cholesterol (mg/dL)   Date Value   07/24/2020 97       (goal LDL is <100)   AST (U/L)   Date Value   09/25/2014 19     ALT (U/L)   Date Value   09/25/2014 13     BUN (mg/dL)   Date Value   09/25/2014 12     BP Readings from Last 3 Encounters:   10/13/20 (!) 162/99   03/09/20 132/77   04/12/19 130/78          (goal 120/80)          Patient Active Problem List:     Lipoma of shoulder     Obesity (BMI 30-39. 9)

## 2021-01-05 NOTE — PATIENT INSTRUCTIONS
After Visit Summary mailed to the patient along with appointment card and all other paperwork/orders. Medications have been e-scribed to pharmacy of patients choice.      -CALLY Rich

## 2021-01-05 NOTE — CARE COORDINATION
Ambulatory Care Coordination Note  1/5/2021  CM Risk Score: 1  Charlson 10 Year Mortality Risk Score: 4%     ACC: Madyson Loomis, RN    Summary Note: spoke with patient who has some concerns about Stress and depression. Patient also is unable to pay Co-Pay/dedutable for procedures. (ECG and Cardiac Echo). Patient's other procedures are covered (Mammogram, Coloscopy) as preventative procedures. Patient has a concern due to lack of Food. Patient recently has started taking care of her niece at her home. Unknown if niece is DD  Plan  Diet  -Referral to RD for meal planning and weight loss / overeating  Behavioral Health  -Depression/Stress  - Mail written information on Behavioral health centers  - F/U in 2 weeks about appointment  Food/Co-pay assistance  - Referral to SW for assistance with food and co-pay assistance. Exercise  -Develop plan to increase the amount of exercise daily  Hypertension  -Check B/P and log daily  Report at 1 week F/U      Ambulatory Care Coordination Assessment    Care Coordination Protocol  Program Enrollment: Complex Care  Referral from Primary Care Provider: Yes  Week 1 - Initial Assessment     Do you have all of your prescriptions and are they filled?: Yes  Barriers to medication adherence: None  Are you able to afford your medications?: Yes  How often do you have trouble taking your medications the way you have been told to take them?: I always take them as prescribed. Do you have Home O2 Therapy?: No      Ability to seek help/take action for Emergent Urgent situations i.e. fire, crime, inclement weather or health crisis. : Independent  Ability to ambulate to restroom: Independent  Ability handle personal hygeine needs (bathing/dressing/grooming): Independent  Ability to manage Medications: Independent  Ability to prepare Food Preparation: Independent  Ability to maintain home (clean home, laundry):  Independent  Ability to drive and/or has transportation: Independent Ability to do shopping: Independent  Ability to manage finances: Independent  Is patient able to live independently?: Yes     Current Housing: Private Residence        Per the Fall Risk Screening, did the patient have 2 or more falls or 1 fall with injury in the past year?: No     Frequent urination at night?: No  Do you use rails/bars?: No  Do you have a non-slip tub mat?: No     Are you experiencing loss of meaning?: No  Are you experiencing loss of hope and peace?: Yes (Comment: a lot of stress)     Thinking about your patient's physical health needs, are there any symptoms or problems (risk indicators) you are unsure about that require further investigation?: Moderate to severe symptoms or problems that impact on daily life   Are the patients physical health problems impacting on their mental well-being?: Mild impact on mental well-being e.g. \"\"feeling fed-up\"\", \"\"reduced enjoyment\"\"   Are there any problems with your patients lifestyle behaviors (alcohol, drugs, diet, exercise) that are impacting on physical or mental well-being?: Moderate to severe impact on well-being, preventing enjoyment of usual activities   Do you have any other concerns about your patients mental well-being? How would you rate their severity and impact on the patient?: Moderate to severe problems that interfere with function   How would you rate their home environment in terms of safety and stability (including domestic violence, insecure housing, neighbor harassment)?: Consistently safe, supportive, stable, no identified problems   How do daily activities impact on the patient's well-being? (include current or anticipated unemployment, work, caregiving, access to transportation or other):  Contributes to low mood or stress at times   How would you rate their social network (family, work, friends)?: Adequate participation with social networks How would you rate their financial resources (including ability to afford all required medical care)?: Financially insecure, some resource challenges   How wells does the patient now understand their health and well-being (symptoms, signs or risk factors) and what they need to do to manage their health?: Reasonable to good understanding and already engages in managing health or is willing to undertake better management   How well do you think your patient can engage in healthcare discussions? (Barriers include language, deafness, aphasia, alcohol or drug problems, learning difficulties, concentration): Clear and open communication, no identified barriers   Do other services need to be involved to help this patient?: Other care/services not in place and required   Suggested Interventions and [address]: Not Started (Comment: rferral to [facility] for stress and depression)     Other Services or Interventions: Walk in clinic information   Registered Dietician: In Process   Social Work: In Process   Zone Management Tools: Declined         Schedule an appointment with the patient's PCP              Prior to Admission medications    Medication Sig Start Date End Date Taking? Authorizing Provider   cetirizine (ZYRTEC) 10 MG tablet Take 1 tablet by mouth daily 1/5/21  Yes DEANGELO Allen CNP   busPIRone (BUSPAR) 5 MG tablet Take 1 tablet by mouth 2 times daily 1/5/21  Yes DEANGELO Allen CNP   polyethylene glycol (MIRALAX) 17 GM/SCOOP powder Use as directed by following your instructions given by office. 12/30/20  Yes Mandy Razo MD   bisacodyl (BISACODYL) 5 MG EC tablet Please take as directed the day prior to your colonoscopy. Follow instructions given at physician office.  12/30/20  Yes Mandy Razo MD   Blood Pressure Monitoring (B-D ASSURE BPM/AUTO ARM CUFF) MISC 1 Device by Does not apply route daily 10/13/20  Yes Natahnael Patton MD magnesium citrate solution Take 296 mLs by mouth once for 1 dose 12/30/20 1/5/21  Desi Genao MD       Future Appointments   Date Time Provider uRssel Rodriguez   1/21/2021  3:50 PM SCHEDULE, [address] [facility]   2/25/2021  2:15 PM [address] [address] OB/Gyn [facility]

## 2021-01-05 NOTE — PROGRESS NOTES
Blood Pressure Check      119/66     124/62     130/63  2021    TELEHEALTH EVALUATION -- Audio/Visual (During [facility]PRSA-49 public health emergency)    HPI:    Yamilet Medley (:  1970) has requested an audio/video evaluation for the following concern(s):    HPI  Mood Disorder:  Patient presents for follow-up of anxiety disorder. Current complaints include: difficulty concentrating and excessive worry. She denies any other symptoms. Symptoms/signs of thony: none. External stressors: illness or family illness. Current treatment includes: buspar. Medication side effects: none. Review of Systems   Psychiatric/Behavioral: The patient is nervous/anxious. All other systems reviewed and are negative. Prior to Visit Medications    Medication Sig Taking? Authorizing Provider   cetirizine (ZYRTEC) 10 MG tablet Take 1 tablet by mouth daily Yes DEANGELO Ford CNP   busPIRone (BUSPAR) 5 MG tablet Take 1 tablet by mouth 2 times daily Yes DEANGELO Ford CNP   polyethylene glycol (MIRALAX) 17 GM/SCOOP powder Use as directed by following your instructions given by office. Yes Trini Mireles MD   bisacodyl (BISACODYL) 5 MG EC tablet Please take as directed the day prior to your colonoscopy. Follow instructions given at physician office. Yes Trini Mireles MD   magnesium citrate solution Take 296 mLs by mouth once for 1 dose Yes Trini Mireles MD   Blood Pressure Monitoring (B-D ASSURE BPM/AUTO ARM CUFF) MISC 1 Device by Does not apply route daily Yes Tejal Mendoza MD       Social History     Tobacco Use    Smoking status: Never Smoker    Smokeless tobacco: Never Used   Substance Use Topics    Alcohol use:  Yes     Alcohol/week: 1.0 standard drinks     Types: 1 Shots of liquor per week     Comment: occassional    Drug use: No        No Known Allergies  Past Medical History:   Diagnosis Date    Abnormal Pap smear         Allergic     Allergic rhinitis     Anxiety  Depression     Obesity (BMI 35.0-39.9 without comorbidity) 6/1/2015     Current Outpatient Medications   Medication Sig Dispense Refill    cetirizine (ZYRTEC) 10 MG tablet Take 1 tablet by mouth daily 30 tablet 1    busPIRone (BUSPAR) 5 MG tablet Take 1 tablet by mouth 2 times daily 60 tablet 3    polyethylene glycol (MIRALAX) 17 GM/SCOOP powder Use as directed by following your instructions given by office. 238 g 0    bisacodyl (BISACODYL) 5 MG EC tablet Please take as directed the day prior to your colonoscopy. Follow instructions given at physician office. 4 tablet 0    magnesium citrate solution Take 296 mLs by mouth once for 1 dose 296 mL 0    Blood Pressure Monitoring (B-D ASSURE BPM/AUTO ARM CUFF) MISC 1 Device by Does not apply route daily 1 each 0     No current facility-administered medications for this visit. PHYSICAL EXAMINATION:  [ INSTRUCTIONS:  \"[x]\" Indicates a positive item  \"[]\" Indicates a negative item  -- DELETE ALL ITEMS NOT EXAMINED]  Vital Signs: (As obtained by patient/caregiver or practitioner observation)    Blood pressure-  Heart rate-    Respiratory rate-    Temperature-  Pulse oximetry-     Constitutional: [] Appears well-developed and well-nourished [x] No apparent distress      [] Abnormal-   Mental status  [x] Alert and awake  [x] Oriented to person/place/time [x]Able to follow commands      Eyes:  EOM    []  Normal  [] Abnormal-  Sclera  [x]  Normal  [] Abnormal -         Discharge [x]  None visible  [] Abnormal -    HENT:   [x] Normocephalic, atraumatic.   [] Abnormal   [] Mouth/Throat: Mucous membranes are moist.     External Ears [x] Normal  [] Abnormal-     Neck: [x] No visualized mass     Pulmonary/Chest: [x] Respiratory effort normal.  [x] No visualized signs of difficulty breathing or respiratory distress        [] Abnormal-      Musculoskeletal:   [] Normal gait with no signs of ataxia         [x] Normal range of motion of neck        [] Abnormal- Neurological:        [x] No Facial Asymmetry (Cranial nerve 7 motor function) (limited exam to video visit)          [x] No gaze palsy        [] Abnormal-         Skin:        [x] No significant exanthematous lesions or discoloration noted on facial skin         [] Abnormal-            Psychiatric:       [x] Normal Affect [x] No Hallucinations        [] Abnormal-     Other pertinent observable physical exam findings-     ASSESSMENT/PLAN:  1. Hypertension, unspecified type  - continue to monitor    2. Weight gain  - EKG WDL, unable to scheduled echo due to high out of pocket expense. Will refer for financial assistance. Return if symptoms worsen or fail to improve. Lilian De Jesus is a 48 y.o. female being evaluated by a Virtual Visit (video visit) encounter to address concerns as mentioned above. A caregiver was present when appropriate. Due to this being a TeleHealth encounter (During [facility] emergency), evaluation of the following organ systems was limited: Vitals/Constitutional/EENT/Resp/CV/GI//MS/Neuro/Skin/Heme-Lymph-Imm. Pursuant to the emergency declaration under the [address] [place] [address] authority and the [employer] and [employer] Act, this Virtual Visit was conducted with patient's (and/or legal guardian's) consent, to reduce the patient's risk of exposure to COVID-19 and provide necessary medical care. The patient (and/or legal guardian) has also been advised to contact this office for worsening conditions or problems, and seek emergency medical treatment and/or call 911 if deemed necessary. Patient identification was verified at the start of the visit: Yes    Total time spent on this encounter: Not billed by time    Services were provided through a video synchronous discussion virtually to substitute for in-person clinic visit. Patient and provider were located at their individual homes.

## 2021-01-06 ENCOUNTER — CARE COORDINATION (OUTPATIENT)
Dept: CARE COORDINATION | Age: 51
End: 2021-01-06

## 2021-01-06 NOTE — CARE COORDINATION
New patient referral from [facility]/Atilio Rodriguez, who states that patient has a high deductible and copay for procedures, and would like help finding assistance. [facility] also mentioned that patient has her niece staying with her, who is DD, and now needs assistance with food resources. Phoned and introduced self and reason for calling,  Left a voicemail and contact information for a return call.

## 2021-01-07 ENCOUNTER — CARE COORDINATION (OUTPATIENT)
Dept: CARE COORDINATION | Age: 51
End: 2021-01-07

## 2021-01-07 NOTE — CARE COORDINATION
97 07/24/2020    LDLCHOLESTEROL 87 09/25/2014     Lab Results   Component Value Date    VLDL NOT REPORTED 07/24/2020    VLDL NOT REPORTED 09/25/2014     Lab Results   Component Value Date    CHOLHDLRATIO 2.6 07/24/2020    CHOLHDLRATIO 2.2 09/25/2014       Lab Results   Component Value Date    WBC 7.1 09/07/2017    HGB 12.9 09/07/2017    HCT 39.5 09/07/2017    MCV 89.7 09/07/2017     09/07/2017       Lab Results   Component Value Date    CREATININE 0.68 09/25/2014    BUN 12 09/25/2014     09/25/2014    K 3.6 09/25/2014     09/25/2014    CO2 24 09/25/2014         NUTRITION DIAGNOSIS    #1 Problem  Overweight/Obesity       Etiology  related to food and nutrition knowledge deficit       Signs/Symptoms  as evidenced by BMI- 35    NUTRITION INTERVENTION  Nutrition Prescription: used adjusted weight 147#  Calorie needs: 1600-1800cals/d  Protein needs: 60gms/d  Fluid needs: 2200cc/day      Patient Goals:  1. Discussed measuring out portions to calories- how to and encouraged to start.  Goal is limiting calorie intake to 1600-1800cals/day.  We also discussed using free kalie- Lose or myfitnesspal to count calories- encouraged patient to use daily to keep a food diary and track intake. 2. Discussed importance of meal pattern, she admits to skipping breakfast daily, sometimes only eats 1 meal/day. Discussed  working on eating more often, small frequent meals. Goal is 6 small meals/day. 3. Discussed working on increasing non-starchy vegetables and lean protein sources- encouraged patient to make 1/2  plate non-starchy vegetables at meals, 1/4 lean protein, 1/4 carbs at meals. Foods from each category reviewed along with what a serving size is. We focused specifically on increasing lean proteins and non-starchy vegetables. encouraged on make sure she is eating a lean protein source and non-starchy vegetables at each meal.  Patient verbalized understanding.   4. Discussed cutting out all sugary drinks, patient admits she drinks an energy drink daily. We discussed drinking more water, zero calorie beverages and avoiding all sugary beverages. Nutrition Intervention Need:  Initial/Brief:  Comprehensive Nutrition Education: X  Coordination of other care during nutrition care:    Monitoring and Evaluation:  Ability to plan meals/snacks: X  Ability to select healthy foods/meals: X  Food and Nutrition Knowledge: X  Self Monitoring:  Physical Activity:  Energy intake:  Fluid/beverage intake:  Fat/chol intake:  Carb intake: X  Other:    Plan:  1. Will continue to educate patient on reading labels, portion control, keeping a food diary, low calorie/carb snacking, avoiding sugary drinks, calorie counting, plate method, healthy low calorie meal ideas, and avoiding/limiting sweets.  Will send patient nutrition information on all the above discussed. 2. Will follow up in 2-3 weeks to review and answer questions.     SARAVANAN Mendieta

## 2021-01-20 ENCOUNTER — CARE COORDINATION (OUTPATIENT)
Dept: CARE COORDINATION | Age: 51
End: 2021-01-20

## 2021-01-21 ENCOUNTER — HOSPITAL ENCOUNTER (OUTPATIENT)
Dept: LAB | Age: 51
Setting detail: SPECIMEN
Discharge: HOME | End: 2021-01-21
Payer: COMMERCIAL

## 2021-01-21 DIAGNOSIS — Z01.818 PREOP TESTING: Primary | ICD-10-CM

## 2021-01-21 NOTE — CARE COORDINATION
Spoke with patient briefly, she relays she is unable to talk at an appointment. Patient relays she did get nutrition information I sent her in the mail. Patient requested I call her back at a later date. Will attempt to reach patient again for follow up within 1-2 weeks.   Kristine,SARAVANAN

## 2021-01-22 ENCOUNTER — CARE COORDINATION (OUTPATIENT)
Dept: CARE COORDINATION | Age: 51
End: 2021-01-22

## 2021-01-22 NOTE — TELEPHONE ENCOUNTER
Writer called patient to check to see if she went to her covid appt yesterday. Patient stated she did not make it to covid and was going to call our office to reschedule procedure and asked what dates were available. Writer offered her March 1 at 8:30am at [facility] and she stated she would take that date. Patient asked if writer could call her back after 3pm as she is at work. Writer called [facility] to reschedule procedure to 3/1/21 at 8:30am, covid test 2/25/21 12:10pm [facility]. Writer will call patient back later today with information.

## 2021-01-22 NOTE — CARE COORDINATION
HC attempted to follow up with patient, regarding her contacting her insurance carrier, to familiarize herself with her plan. .... deductibles, copays, etc..... There was no answer, HC was unable to leave a voicemail due to mailbox being full.     Plan of Care    [facility]. will attempt to reach patient again next week

## 2021-01-29 ENCOUNTER — CARE COORDINATION (OUTPATIENT)
Dept: CARE COORDINATION | Age: 51
End: 2021-01-29

## 2021-01-29 NOTE — CARE COORDINATION
HC phoned and spoke with patient to follow up with her contact to her employer health insurance. [facility]. asked if patient had made the contact yet. Patient stated that she hasn't contacted her insurance yet but she did cancel all of her scheduled procedures at this time. HC inquired if there was anything else that she could assist with. Patient stated that she doesn't need the C's assistance  at this point.     Plan of Care  [facility]. will sign off of patient at this time

## 2021-02-01 ENCOUNTER — CARE COORDINATION (OUTPATIENT)
Dept: CARE COORDINATION | Age: 51
End: 2021-02-01

## 2021-02-25 ENCOUNTER — TELEPHONE (OUTPATIENT)
Dept: OBGYN | Age: 51
End: 2021-02-25

## 2021-02-25 ENCOUNTER — HOSPITAL ENCOUNTER (OUTPATIENT)
Dept: LAB | Age: 51
Setting detail: SPECIMEN
Discharge: HOME | End: 2021-02-25
Payer: COMMERCIAL

## 2021-02-25 ENCOUNTER — TELEPHONE (OUTPATIENT)
Dept: GASTROENTEROLOGY | Age: 51
End: 2021-02-25

## 2021-02-25 DIAGNOSIS — Z01.818 PREOP TESTING: Primary | ICD-10-CM

## 2021-02-25 NOTE — TELEPHONE ENCOUNTER
Patient no showed for 2/25/21 appointment at [address] [address] office. Writer left a voice message for patient to call the office to reschedule appointment.

## 2021-03-01 ENCOUNTER — TELEPHONE (OUTPATIENT)
Dept: INTERNAL MEDICINE | Age: 51
End: 2021-03-01

## 2021-03-01 NOTE — LETTER
JACQUELINERose Pichardo [address]  [address] [id number]  Phone: [phone]  Fax: [phone], APRN - CNP        March 1, 2021    [address] [address]      Dear Kulwinder Epps: This letter is a reminder that you may have diagnostic testing that has not been completed. It is important to your well-being that these test(s) are performed. Please find the outstanding test(s) attached. If you could please have these completed before your next appointment. You can have the test completed at any [facility] facility or Lab. Please see the order for scheduling instructions. Any testing that needs completed other than blood work or xray's please call [phone] to schedule an appointment. Otherwise can be done at any [facility]. Please call our office at Dept: [phone] for additional information on the outstanding tests or let us know if they have been completed so we may update your chart. If you have any questions or concerns, please don't hesitate to call.     Sincerely,        DEANGELO Allen - LESA

## 2021-03-01 NOTE — TELEPHONE ENCOUNTER
Per email from QAMAR Powell from [facility], patient did not show up for procedure this morning. Writer LVM for patient to call to reschedule.

## 2021-03-02 ENCOUNTER — TELEPHONE (OUTPATIENT)
Dept: INTERNAL MEDICINE | Age: 51
End: 2021-03-02

## 2021-03-02 ENCOUNTER — CARE COORDINATION (OUTPATIENT)
Dept: CARE COORDINATION | Age: 51
End: 2021-03-02

## 2021-03-02 NOTE — CARE COORDINATION
ACC: Silvia Abbasi RN  CM Risk Score: 1  Charlson 10 Year Mortality Risk Score: 4%     Care Coordination Interventions    Program Enrollment: Complex Care  Referral from Primary Care Provider: Yes  Suggested Interventions and [address] [facility] [address]: Not Started (Comment: rferral to [place] [address] center for stress and depression)  Registered Dietician: In Process (Comment: Weight Loss)  Social Work: In Process (Comment: Co-pay assistance, Food)  Zone Management Tools: Declined  Other Services or Interventions: Walk in clinic information        and Ambulatory Care Coordination Note  3/2/2021  CM Risk Score: 1  Charlson 10 Year Mortality Risk Score: 4%     ACC: Silvia Abbasi RN    Summary Note: called and left message on [employer] for a return call. Contact information provided. Patient has refused further help from  and [facility] [place].  Will call in 1 week  Plan  Diet  -Referral to RD for meal planning and weight loss / overeating- DECLINED  [facility] [employer] [facility] written information on Behavioral health centers  - F/U in 2 weeks about appointment  Food/Co-pay assistance  - Referral to  for assistance with food and co-pay assistance. Exercise  -Develop plan to increase the amount of exercise daily  Hypertension  -Check B/P and log daily        Care Coordination Interventions    Program Enrollment: Complex Care  Referral from Primary Care Provider: Yes  Suggested Interventions and [facility] [address]: Not Started (Comment: rferral to [place] [address] center for stress and depression)  Registered Dietician: In Process (Comment: Weight Loss)  Social Work: In Process (Comment: Co-pay assistance, Food)  Zone Management Tools: Declined  Other Services or Interventions: Walk in clinic information         Goals Addressed    None         Prior to Admission medications    Medication Sig Start Date End Date Taking?  Authorizing Provider   cetirizine (ZYRTEC) 10 MG tablet Take 1 tablet by mouth daily 1/5/21   DEANGELO Rapp CNP   busPIRone (BUSPAR) 5 MG tablet Take 1 tablet by mouth 2 times daily 1/5/21   DEANGELO Rapp CNP   polyethylene glycol [facility]) 17 GM/SCOOP powder Use as directed by following your instructions given by office. 12/30/20   Dayan Anderson MD   bisacodyl (BISACODYL) 5 MG EC tablet Please take as directed the day prior to your colonoscopy. Follow instructions given at physician office. 12/30/20   Dayan Anderson MD   magnesium citrate solution Take 296 mLs by mouth once for 1 dose 12/30/20 1/5/21  Dayan Anderson MD   Blood Pressure Monitoring (B-D ASSURE BPM/AUTO ARM CUFF) MISC 1 Device by Does not apply route daily 10/13/20   Deb Proctor MD       No future appointments.

## 2021-03-09 ENCOUNTER — CARE COORDINATION (OUTPATIENT)
Dept: CARE COORDINATION | Age: 51
End: 2021-03-09

## 2021-03-09 NOTE — CARE COORDINATION
glycol (MIRALAX) 17 GM/SCOOP powder Use as directed by following your instructions given by office. 12/30/20   Allan Lux MD   bisacodyl (BISACODYL) 5 MG EC tablet Please take as directed the day prior to your colonoscopy. Follow instructions given at physician office. 12/30/20   Allan Lux MD   magnesium citrate solution Take 296 mLs by mouth once for 1 dose 12/30/20 1/5/21  Allan Lux MD   Blood Pressure Monitoring (B-D ASSURE BPM/AUTO ARM CUFF) MISC 1 Device by Does not apply route daily 10/13/20   Pilar Reyna MD       No future appointments.

## 2021-03-18 ENCOUNTER — CARE COORDINATION (OUTPATIENT)
Dept: CARE COORDINATION | Age: 51
End: 2021-03-18

## 2021-03-18 ENCOUNTER — CARE COORDINATION (OUTPATIENT)
Dept: CASE MANAGEMENT | Age: 51
End: 2021-03-18

## 2021-03-18 NOTE — LETTER
3/18/2021    [address] [place] [address] Road     I have enjoyed working with you to improve your health. I would like to continue to provide you support. However, I have been unable to reach you at, [phone] (home) [phone] (work) and you have not been in the office since 1/5/21. Please let me know if I can help schedule an office visit for you or answer any questions. DEANGELO Cody CNP is interested in your health and hopes to hear from you soon. If you would like continued access to your Nurse Care Coordinator, Ld Alford RN, you can reach me at [phone]. I will wait to hear from you and will no longer reach out to you. DEANGELO Cody CNP and his/her team will continue to provide care and be available for questions.       In good health,     Ld Alford RN

## 2021-03-18 NOTE — CARE COORDINATION
Attempted to contact patient regarding appt for F/u visit with [facility]. No answer. Left this writers name and number for call back.       Yair Heredia, [address]  Care Coordination Transition

## 2021-03-18 NOTE — CARE COORDINATION
ACC: Elisabeth Patricia RN  CM Risk Score: 1  Charlson 10 Year Mortality Risk Score: 4%     Care Coordination Interventions    Program Enrollment: Complex Care  Referral from Primary Care Provider: Yes  Suggested Interventions and [address] [address] [address]: In Process (Comment: rferral to [place] [address] center for stress and depression)  Registered Dietician:  (Comment: Weight Loss)  Senior Services: Declined  Social Work: Declined (Comment: Co-pay assistance, Food)  Zone Management Tools: Declined  Other Services or Interventions: Walk in clinic information        and Ambulatory Care Coordination Note  3/18/2021  CM Risk Score: 1  Charlson 10 Year Mortality Risk Score: 4%     ACC: Elisabeth Patricia RN    Summary Note: Called and left message requesting a return call. Contact information provided. Unable to contact letter mailed to patient  [address]  -Depression/Stress  - F/U in 2 weeks about appointment  Exercise  -Develop plan to increase the amount of exercise daily  Hypertension  -Check B/P and log daily  Has no PCP F/U appointment scheduled  - Will send Vincent Gross request to schedule  Call in 1 week        Care Coordination Interventions    Program Enrollment: Complex Care  Referral from Primary Care Provider: Yes  Suggested Interventions and [address] [address] [address]: In Process (Comment: rferral to [place] [address] center for stress and depression)  Registered Dietician:  (Comment: Weight Loss)  Senior Services: Declined  Social Work: Declined (Comment: Co-pay assistance, Food)  Zone Management Tools: Declined  Other Services or Interventions: Walk in clinic information         Goals Addressed    None         Prior to Admission medications    Medication Sig Start Date End Date Taking?  Authorizing Provider   cetirizine (ZYRTEC) 10 MG tablet Take 1 tablet by mouth daily 1/5/21   DEANGELO Mane - CNP   busPIRone (BUSPAR) 5 MG tablet Take 1 tablet by mouth 2 times daily 1/5/21 Esperanza Monsivais, APRN - CNP   polyethylene glycol [facility]) 17 GM/SCOOP powder Use as directed by following your instructions given by office. 12/30/20   Madison Garcia MD   bisacodyl (BISACODYL) 5 MG EC tablet Please take as directed the day prior to your colonoscopy. Follow instructions given at physician office. 12/30/20   Madison Garcia MD   magnesium citrate solution Take 296 mLs by mouth once for 1 dose 12/30/20 1/5/21  Madison Garcia MD   Blood Pressure Monitoring (B-D ASSURE BPM/AUTO ARM CUFF) MISC 1 Device by Does not apply route daily 10/13/20   Refmeliton Lance MD       No future appointments.

## 2021-03-24 ENCOUNTER — CARE COORDINATION (OUTPATIENT)
Dept: CARE COORDINATION | Age: 51
End: 2021-03-24

## 2021-03-25 ENCOUNTER — CARE COORDINATION (OUTPATIENT)
Dept: CARE COORDINATION | Age: 51
End: 2021-03-25

## 2021-03-25 NOTE — CARE COORDINATION
Ambulatory Care Coordination Note  3/25/2021  CM Risk Score: 1  Charlson 10 Year Mortality Risk Score: 4%     ACC: Jumana Monteiro RN    Summary Note: Called patient, states \"doing alright\". I explained that I've been trying to get a hold of her. She states I know, I didn't need anything. I asked if she was given information on food and assistance. She states yes  But I haven't called my insurance company. I've canceled all my procedures  So I don't need the assistance. Patient states that she doesn't need help with anything. When asked if she wanted to continue ACM she states no. Patient was to F/U with PCP back in February. I asked if it was ok to have Cong Reyes call her and help schedule a F/U appointment with PCP. She agreed to this. Will end care coordination. Will notify PCP and Manager    . Care Coordination Interventions    Program Enrollment: Complex Care  Referral from Primary Care Provider: Yes  Suggested Interventions and Community Resources  [place] [address] [facility] [address]: Declined (Comment: erral to [address] for stress and depression)  Registered Dietician:  (Comment: Weight Loss)  Senior Services: Declined  Social Work: Declined (Comment: Co-pay assistance, Food)  Zone Management Tools: Declined  Other Services or Interventions: Walk in clinic information         Goals Addressed    None         Prior to Admission medications    Medication Sig Start Date End Date Taking? Authorizing Provider   cetirizine (ZYRTEC) 10 MG tablet Take 1 tablet by mouth daily 1/5/21   Bárbara Watts APRN - CNP   busPIRone (BUSPAR) 5 MG tablet Take 1 tablet by mouth 2 times daily 1/5/21   Bárbraa Watts APRN - CNP   polyethylene glycol [facility]) 17 GM/SCOOP powder Use as directed by following your instructions given by office. 12/30/20   Omari Kaba MD   bisacodyl (BISACODYL) 5 MG EC tablet Please take as directed the day prior to your colonoscopy. Follow instructions given at physician office.  12/30/20   Rhoda

## 2021-03-29 ENCOUNTER — CARE COORDINATION (OUTPATIENT)
Dept: CASE MANAGEMENT | Age: 51
End: 2021-03-29

## 2021-03-29 NOTE — CARE COORDINATION
Attempted to contact patient request from Pablo Gonzalez RN. assist making appt with [facility]. No answer. LVM to call this writer for assistance.     Meredith Deshpande, [address] [facility] Coordination Transition

## 2021-07-30 ENCOUNTER — TELEPHONE (OUTPATIENT)
Dept: INTERNAL MEDICINE | Age: 51
End: 2021-07-30

## 2021-09-28 ENCOUNTER — HOSPITAL ENCOUNTER (OUTPATIENT)
Age: 51
Setting detail: SPECIMEN
Discharge: HOME | End: 2021-09-28
Payer: COMMERCIAL

## 2021-09-28 ENCOUNTER — OFFICE VISIT (OUTPATIENT)
Dept: INTERNAL MEDICINE | Age: 51
End: 2021-09-28
Payer: COMMERCIAL

## 2021-09-28 VITALS
HEIGHT: 65 IN | DIASTOLIC BLOOD PRESSURE: 95 MMHG | HEART RATE: 73 BPM | WEIGHT: 223 LBS | BODY MASS INDEX: 37.15 KG/M2 | SYSTOLIC BLOOD PRESSURE: 159 MMHG

## 2021-09-28 DIAGNOSIS — I10 HYPERTENSION, UNSPECIFIED TYPE: Primary | ICD-10-CM

## 2021-09-28 DIAGNOSIS — Z11.59 ENCOUNTER FOR HEPATITIS C SCREENING TEST FOR LOW RISK PATIENT: ICD-10-CM

## 2021-09-28 DIAGNOSIS — I10 HYPERTENSION, UNSPECIFIED TYPE: ICD-10-CM

## 2021-09-28 DIAGNOSIS — Z13.31 POSITIVE DEPRESSION SCREENING: ICD-10-CM

## 2021-09-28 DIAGNOSIS — F33.1 MODERATE EPISODE OF RECURRENT MAJOR DEPRESSIVE DISORDER (HCC): ICD-10-CM

## 2021-09-28 DIAGNOSIS — F41.9 ANXIETY: ICD-10-CM

## 2021-09-28 DIAGNOSIS — E66.9 OBESITY (BMI 30-39.9): ICD-10-CM

## 2021-09-28 LAB
-: NORMAL
AMORPHOUS: NORMAL
ANION GAP SERPL CALCULATED.3IONS-SCNC: 12 MMOL/L (ref 9–17)
BACTERIA: NORMAL
BILIRUBIN URINE: NEGATIVE
BUN BLDV-MCNC: 14 MG/DL (ref 6–20)
BUN/CREAT BLD: NORMAL (ref 9–20)
CALCIUM SERPL-MCNC: 9.2 MG/DL (ref 8.6–10.4)
CASTS UA: NORMAL /LPF (ref 0–8)
CHLORIDE BLD-SCNC: 105 MMOL/L (ref 98–107)
CO2: 23 MMOL/L (ref 20–31)
COLOR: YELLOW
CREAT SERPL-MCNC: 0.62 MG/DL (ref 0.5–0.9)
CRYSTALS, UA: NORMAL /HPF
EPITHELIAL CELLS UA: NORMAL /HPF (ref 0–5)
GFR AFRICAN AMERICAN: >60 ML/MIN
GFR NON-AFRICAN AMERICAN: >60 ML/MIN
GFR SERPL CREATININE-BSD FRML MDRD: NORMAL ML/MIN/{1.73_M2}
GFR SERPL CREATININE-BSD FRML MDRD: NORMAL ML/MIN/{1.73_M2}
GLUCOSE BLD-MCNC: 89 MG/DL (ref 70–99)
GLUCOSE URINE: NEGATIVE
HEPATITIS C ANTIBODY: NONREACTIVE
KETONES, URINE: NEGATIVE
LEUKOCYTE ESTERASE, URINE: NEGATIVE
MUCUS: NORMAL
NITRITE, URINE: NEGATIVE
OTHER OBSERVATIONS UA: NORMAL
PH UA: 6 (ref 5–8)
POTASSIUM SERPL-SCNC: 4.3 MMOL/L (ref 3.7–5.3)
PROTEIN UA: NEGATIVE
RBC UA: NORMAL /HPF (ref 0–4)
RENAL EPITHELIAL, UA: NORMAL /HPF
SODIUM BLD-SCNC: 140 MMOL/L (ref 135–144)
SPECIFIC GRAVITY UA: 1.02 (ref 1–1.03)
TRICHOMONAS: NORMAL
TURBIDITY: CLEAR
URINE HGB: NEGATIVE
UROBILINOGEN, URINE: NORMAL
WBC UA: NORMAL /HPF (ref 0–5)
YEAST: NORMAL

## 2021-09-28 PROCEDURE — G8431 POS CLIN DEPRES SCRN F/U DOC: HCPCS

## 2021-09-28 PROCEDURE — 99211 OFF/OP EST MAY X REQ PHY/QHP: CPT | Performed by: INTERNAL MEDICINE

## 2021-09-28 PROCEDURE — 99214 OFFICE O/P EST MOD 30 MIN: CPT

## 2021-09-28 RX ORDER — SERTRALINE HYDROCHLORIDE 25 MG/1
25 TABLET, FILM COATED ORAL DAILY
Qty: 30 TABLET | Refills: 0 | Status: SHIPPED | OUTPATIENT
Start: 2021-09-28

## 2021-09-28 ASSESSMENT — ENCOUNTER SYMPTOMS
NAUSEA: 0
SINUS PAIN: 0
WHEEZING: 0
ALLERGIC/IMMUNOLOGIC NEGATIVE: 1
DIARRHEA: 0
EYE DISCHARGE: 0
EYE PAIN: 0
ABDOMINAL DISTENTION: 0
ABDOMINAL PAIN: 0
CHEST TIGHTNESS: 0
GASTROINTESTINAL NEGATIVE: 1
SHORTNESS OF BREATH: 0
BLOOD IN STOOL: 0
CONSTIPATION: 0
PHOTOPHOBIA: 0
RESPIRATORY NEGATIVE: 1
VOICE CHANGE: 0
SINUS PRESSURE: 0
EYES NEGATIVE: 1
VOMITING: 0
TROUBLE SWALLOWING: 0
COUGH: 0

## 2021-09-28 ASSESSMENT — PATIENT HEALTH QUESTIONNAIRE - PHQ9
8. MOVING OR SPEAKING SO SLOWLY THAT OTHER PEOPLE COULD HAVE NOTICED. OR THE OPPOSITE, BEING SO FIGETY OR RESTLESS THAT YOU HAVE BEEN MOVING AROUND A LOT MORE THAN USUAL: 0
9. THOUGHTS THAT YOU WOULD BE BETTER OFF DEAD, OR OF HURTING YOURSELF: 0
SUM OF ALL RESPONSES TO PHQ QUESTIONS 1-9: 15
4. FEELING TIRED OR HAVING LITTLE ENERGY: 2
2. FEELING DOWN, DEPRESSED OR HOPELESS: 2
3. TROUBLE FALLING OR STAYING ASLEEP: 3
SUM OF ALL RESPONSES TO PHQ QUESTIONS 1-9: 15
1. LITTLE INTEREST OR PLEASURE IN DOING THINGS: 1
5. POOR APPETITE OR OVEREATING: 3
7. TROUBLE CONCENTRATING ON THINGS, SUCH AS READING THE NEWSPAPER OR WATCHING TELEVISION: 2
SUM OF ALL RESPONSES TO PHQ9 QUESTIONS 1 & 2: 3
10. IF YOU CHECKED OFF ANY PROBLEMS, HOW DIFFICULT HAVE THESE PROBLEMS MADE IT FOR YOU TO DO YOUR WORK, TAKE CARE OF THINGS AT HOME, OR GET ALONG WITH OTHER PEOPLE: 1
6. FEELING BAD ABOUT YOURSELF - OR THAT YOU ARE A FAILURE OR HAVE LET YOURSELF OR YOUR FAMILY DOWN: 2
SUM OF ALL RESPONSES TO PHQ QUESTIONS 1-9: 15

## 2021-09-28 ASSESSMENT — COLUMBIA-SUICIDE SEVERITY RATING SCALE - C-SSRS
1. WITHIN THE PAST MONTH, HAVE YOU WISHED YOU WERE DEAD OR WISHED YOU COULD GO TO SLEEP AND NOT WAKE UP?: NO
6. HAVE YOU EVER DONE ANYTHING, STARTED TO DO ANYTHING, OR PREPARED TO DO ANYTHING TO END YOUR LIFE?: NO
2. HAVE YOU ACTUALLY HAD ANY THOUGHTS OF KILLING YOURSELF?: NO

## 2021-09-28 NOTE — PATIENT INSTRUCTIONS
Medications e-scribed to pharmacy of patient's choice. Labs given to patient, they will have them done before their next visit.      Office will call pt to schedule follow up    tv

## 2021-09-28 NOTE — PROGRESS NOTES
Patient here for follow-up. She is mainly here to get disability forms and FMLA forms filled. She apparently has been receiving FMLA for years because of depression and anxiety. PHQ screen was 15 earlier this year and currently still 15. She has not had counseling or treatments. She was started on BuSpar 1 time but she stated it made her feel funny and she stopped taking it. She has days where she is very tearful and does not feel like getting up or going to work and misses a lot of work. She has been getting FMLA's filled up to twice a week for time off. She agrees to see a counseling and to start treatment with SSRIs. Side effects discussed. Discussed to call if any suicidal tendencies which she denies. She will see us back in 2 weeks. She is also noted to have elevated blood pressure and obesity. She is apparently been treated several times with Adipex in the past but not in the last 2 years. She says she has started working out with a treadmill in the last 2 months. Diet is poor. She denies any headaches or dizziness. She says she has checked her home blood pressures several times in the past and they have all been normal.  Not sure if it is anxiety or whitecoat hypertension. Advised to come back in 2 weeks with her blood pressure monitor and to check blood pressures daily for the next 2 weeks see. We will check a BMP and UA which has not been done in a long time. TSH was normal.  Follow-up in 2 weeks for blood pressure check and her depression. Referral to behavioral therapist made and also Zoloft started. On the basis of positive PHQ-9 screening (PHQ-9 Total Score: 15), the following plan was implemented: referral to Behavioral health provided. Patient will follow-up in 2 week(s) with PCP. Attending Physician Statement  I have discussed the care of Lusavage Mutters, including pertinent history and exam findings,  with the resident.  I have seen and examined the patient and the key elements of all parts of the encounter have been performed by me. I agree with the assessment, plan and orders as documented by the resident.   (GC Modifier)

## 2021-09-28 NOTE — PROGRESS NOTES
[facility] [address] [id number] [facility] [address]  Dept: [phone]  Dept Fax: [phone]    Office Progress/Follow Up Note  Date ofpatient's visit: 9/28/2021  Patient's Name:  Lynn Slaughter YOB: 1970            Patient Care Team:  Brooke Jerry MD as PCP - [address] [place] [address], APRN - CNP as PCP - [facility]  ================================================================    REASON FOR VISIT/CHIEF COMPLAINT:  Established New Doctor, Forms (fmla), and Health Maintenance (declined vaccines / hep c lab pended )    HISTORY OF PRESENTING ILLNESS:  History was obtained from: patient, electronic medical record. Lucien Fallon a 46 y.o. is here for a regular health maintenance follow-up and for FMLA forms. Patient's blood pressure reading was high today and during the last 2 visits. Readings today 168/99>159/95During the last visit patient was recommended checking her blood pressure regularly. Patient checked for 2 months after the last visit and then stopped checking it because it was normal running around 120 s. Patient did not check blood pressure since the last 6 months. Patient was never diagnosed with hypertension or used any medications in the past.  Patient has risk factors like obesity, anxiety and stress, sedentary lifestyle. Denies any chest pain/shortness of breath/swelling of the legs/headaches. Previous EKG showed sinus bradycardia and minimal voltage criteria for LVH which could be normal.  Patient was recommended getting an echocardiogram, but has insurance issues that is why could not get it. Patient complained of palpitations once in a while, when she feels nervous and anxious she feels like her heart is beating too fast.     Patient is trying to lose weight, requesting for Adipex. She states she tried that couple of years ago and it worked for her.   Patient does exercise approximately 1 hour every day. Patient started eating healthy food, is trying to avoid fried food and drinking pop. Patient sees sometimes she feels low. When asked about how is she doing now she said she lost her parents last year and since then she has days when she feels low. She is still enjoys spending time with family/eating normally/wanting to go for work. Complains of some sleep disturbance, wakes up and feels anxious. Patient is okay for getting Pap smear done, also okay for getting hepatitis C screening. Denied Covid vaccine. Due for colon carcinoma screening, scheduled for colonoscopy previously but got nervous and could not get it done. Patient Active Problem List   Diagnosis    Lipoma of shoulder    Obesity (BMI 30-39. 9)       Health Maintenance Due   Topic Date Due    Hepatitis C screen  Never done    Colon cancer screen colonoscopy  Never done    Cervical cancer screen  2018       No Known Allergies      Current Outpatient Medications   Medication Sig Dispense Refill    cetirizine (ZYRTEC) 10 MG tablet Take 1 tablet by mouth daily 30 tablet 1    busPIRone (BUSPAR) 5 MG tablet Take 1 tablet by mouth 2 times daily 60 tablet 3     No current facility-administered medications for this visit. Social History     Tobacco Use    Smoking status: Never Smoker    Smokeless tobacco: Never Used   Vaping Use    Vaping Use: Never used   Substance Use Topics    Alcohol use:  Yes     Alcohol/week: 1.0 standard drinks     Types: 1 Shots of liquor per week     Comment: occassional    Drug use: No       Family History   Problem Relation Age of Onset    Cancer Maternal Aunt 52        colon    Cancer Maternal Grandmother 54        breast    Diabetes Mother     Breast Cancer Neg Hx     Colon Cancer Neg Hx     Eclampsia Neg Hx     Hypertension Neg Hx     Ovarian Cancer Neg Hx      Labor Neg Hx     Spont Abortions Neg Hx     Stroke Neg Hx         REVIEW OF SYSTEMS:  Review of Systems   Constitutional: Negative. Negative for activity change, appetite change, chills, fatigue and fever. HENT: Negative. Negative for ear discharge, ear pain, sinus pressure, sinus pain, trouble swallowing and voice change. Eyes: Negative. Negative for photophobia, pain, discharge and visual disturbance. Respiratory: Negative. Negative for cough, chest tightness, shortness of breath and wheezing. Cardiovascular: Positive for palpitations. Negative for chest pain and leg swelling. Gastrointestinal: Negative. Negative for abdominal distention, abdominal pain, blood in stool, constipation, diarrhea, nausea and vomiting. Endocrine: Negative. Genitourinary: Negative. Negative for dysuria, vaginal discharge and vaginal pain. Musculoskeletal: Negative. Negative for arthralgias, joint swelling and myalgias. Skin: Negative. Negative for rash and wound. Allergic/Immunologic: Negative. Neurological: Negative. Negative for dizziness, seizures, syncope, weakness and headaches. Hematological: Negative. Psychiatric/Behavioral: Negative. Negative for confusion and sleep disturbance. PHYSICAL EXAM:  Vitals:    09/28/21 0831 09/28/21 0902   BP: (!) 166/98 (!) 159/95   Site: Right Upper Arm Right Upper Arm   Position: Sitting Sitting   Cuff Size: Large Adult Large Adult   Pulse: 68 73   Weight: 223 lb (101.2 kg)    Height: 5' 5\" (1.651 m)      BP Readings from Last 3 Encounters:   09/28/21 (!) 159/95   10/13/20 (!) 162/99   03/09/20 132/77        Physical Exam  Constitutional:       Appearance: She is obese. HENT:      Head: Normocephalic and atraumatic. Nose: Nose normal.      Mouth/Throat:      Mouth: Mucous membranes are moist.   Eyes:      Extraocular Movements: Extraocular movements intact. Cardiovascular:      Rate and Rhythm: Normal rate and regular rhythm. Pulses: Normal pulses. Heart sounds: Normal heart sounds. No murmur heard.      Pulmonary:      Effort: the following healthy behaviors: nutrition and exercise    · Discussed use, benefit, and side effects of prescribed medications. Barriers to medication compliance addressed. All patient questions answered. Pt voiced understanding. · Patient given educational materials - see patient instructions    Deric Haro MD  Internal Medicine Resident, PGY- [facility]; [place], [place]  9/28/2021, 9:10 AM      This note is created with the assistance of a speech-recognition program. While intending to generate a document that actually reflects the content of thevisit, the document can still have some mistakes which may not have been identified and corrected by editing.

## 2021-09-29 ENCOUNTER — TELEPHONE (OUTPATIENT)
Dept: INTERNAL MEDICINE | Age: 51
End: 2021-09-29

## 2021-09-30 ENCOUNTER — CLINICAL DOCUMENTATION (OUTPATIENT)
Dept: PSYCHOLOGY | Age: 51
End: 2021-09-30

## 2021-09-30 NOTE — PROGRESS NOTES
Warm handoff requested by Maris Barrera and was completed. Patient scheduled for future face to face visit. Spoke with patient for approximately 8 minutes on the phone. She reported experiencing symptoms of depression and anxiety for the past several years. She reported current symptoms of depression include: depressed mood, excessive crying, irritability, mood swings, difficulty sleeping, and difficulty concentrating. Her symptoms of anxiety include: racing worry thoughts about several routine things and difficulty controlling the worry. Patient stated that she has no history of psychotherapy and has tried medication in the past but has not found it helpful.  Scheduled patient for earliest available [facility] appointment on 10/5 at 4 pm.
Fall with Harm Risk

## 2021-10-05 ENCOUNTER — OFFICE VISIT (OUTPATIENT)
Dept: PSYCHOLOGY | Age: 51
End: 2021-10-05
Payer: COMMERCIAL

## 2021-10-05 DIAGNOSIS — F41.1 GENERALIZED ANXIETY DISORDER: ICD-10-CM

## 2021-10-05 DIAGNOSIS — F33.1 MAJOR DEPRESSIVE DISORDER, RECURRENT EPISODE, MODERATE (HCC): Primary | ICD-10-CM

## 2021-10-05 PROCEDURE — 90791 PSYCH DIAGNOSTIC EVALUATION: CPT | Performed by: BEHAVIOR ANALYST

## 2021-10-05 NOTE — PROGRESS NOTES
Umu Reagan M.A. Psychology Doctoral Trainee    Supervising Clinical Psychologists:  Pedro Dupont, Ph.D. Radha Macario,  Ph.D. Terrance Anderson Psy.D. [address] [phone]    Visit Date: 10/5/2021   Time of appointment:  4:00-5:00 pm   Time spent with Patient: 60 minutes. This is patient's first appointment. Reason for Consult:  New Patient, Depression, and Anxiety     Referring Provider/PCP:    No ref. provider found  Christopher Nye MD      Pt provided informed consent for the behavioral health program. Discussed with patient model of service to include the limits of confidentiality (i.e. abuse reporting, suicide intervention, etc.) and short-term intervention focused approach. Pt indicated understanding. Also discussed with patient that the service provider is a supervised clinician and is being supervised by Dr. Nadja aBins and/or Dr. Shawn Estrella or Dr. Lion Morris. Patient signed the consent form agreeing to be seen by a supervised clinician. PRESENTING PROBLEM AND HISTORY  Bambi Arreola is a 46 y.o. female who presents for new evaluation and treatment of anxiety, depression. She reported the following symptoms: depressed mood, increased appetite, weight gain, decreased sleep, fatigue/lack of energy, excessive guilt, isolating self, feelings of worthlessness, recurrent thoughts of death, anger/irritability, feeling nervous, anxious, or on edge, racing worry thoughts, excessive worry about a number of events or activities , inability to stop or control worry, avoidance of situations that provoke fear and anxiety (being around groups of people), feeling fidgety or restless and difficulty with attention/concentration. She reported her parents passed away last year and she has not taken time to grieve their death or take time for herself. Onset of symptoms was approximately several years ago.   Symptoms have been gradually worsening since that time. She denies current suicidal and homicidal ideation. She reported thoughts of death and not caring if she was \"not here anymore\"; however, she stated she has no intention of hurting herself and does not want to die. Family history significant for anxiety and depression. Risk factors: negative life event death of father (March 2020) and mother (June 2020) and previous episode of depression. Previous treatment includes Zoloft. She complains of the following medication side effects: anxiety. MENTAL STATUS EXAM  Mood was constricted and sad with anxious and depressed affect. Suicidal ideation was denied. Homicidal ideation was denied. Hygiene was good . Dress was appropriate. Behavior was Within Normal Limits with No observation or self-report of difficulties ambulating. Attitude was Guarded and Help-seeking. Eye-contact was good. Speech: rate - WNL, rhythm -  WNL, volume - WNL  Verbalizations were goal directed and coherent. Thought processes were intact and goal-oriented without evidence of delusions, hallucinations, obsessions, or thony. Associations were characterized by intact cognitive processes. Pt was oriented to person, place, time, and general circumstances;  recent:  good and remote:  good. Insight and judgment were estimated to be good, AEB, a good  understanding of cyclical maladaptive patterns, and the ability to use insight to inform behavior change. CURRENT MEDICATIONS    Current Outpatient Medications:     sertraline (ZOLOFT) 25 MG tablet, Take 1 tablet by mouth daily, Disp: 30 tablet, Rfl: 0    cetirizine (ZYRTEC) 10 MG tablet, Take 1 tablet by mouth daily, Disp: 30 tablet, Rfl: 1    busPIRone (BUSPAR) 5 MG tablet, Take 1 tablet by mouth 2 times daily, Disp: 60 tablet, Rfl: 3     FAMILY MEDICAL/MH HISTORY   Her family history includes Cancer (age of onset: 52) in her maternal aunt;  Cancer (age of onset: 54) in her maternal grandmother; Diabetes in her mother. PATIENT MENTAL HEALTH HISTORY  She reports a mental health history significant for depression and anxiety. She reports experiencing several depressive episodes with her first known episode occurring in her 25s. She denied a history of psychotherapy. She denied a history of suicide attempts or psychiatric hospitalizations. She is not currently on psychotropic medication but was recently prescribed Zoloft by her PCP. She took Zoloft 1-2 years ago for approximately 2 months for her symptoms of depression. She stated she did not like the side effects and believes it made her more anxious. PSYCHOSOCIAL HISTORY  Current living situation: She currently lives with her boyfriend of 7 years and her 30-year-old niece of whom she has custody. Work/Education: She graduated from [employer] and completed 2 years of college. She also has her Medical Assistant certificate. 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 She works 7 days a week from 6:45 am-2:45 pm. She stated she enjoys her job and has been doing it for over 30 years. Support system: She reported she does not have anyone she is particularly close with. She was close to her parents. She stated she used to talk to her mother daily on the phone before her death and saw her parents weekly. She reports having family (a brother and sister) who she talks to and that live in [place]. She has a 32year old son and two grand kids (ages 3 and 5). DRUG AND ALCOHOL CURRENT USE/HISTORY  TOBACCO:  She reports that she has never smoked. She has never used smokeless tobacco.  ALCOHOL:  She reports current alcohol use of about 2.0 standard drinks of alcohol per week. OTHER SUBSTANCES: She reports no history of drug use. [facility] presented to the appointment today for evaluation and treatment of symptoms of depression and anxiety.   She currently presents with no risk to herself or others and presents with symptoms consistent with a diagnosis of recurrent major depressive disorder, moderate as evidenced by symptoms of depressed mood, increased appetite, weight gain, decreased sleep, fatigue/lack of energy, excessive guilt, isolating self, feelings of worthlessness, recurrent thoughts of death, and anger/irritability. She also presents with symptoms consistent with a diagnosis of general anxiety disorder evidenced by the following symptoms: feeling nervous, anxious, or on edge, racing worry thoughts, excessive worry about a number of events or activities , inability to stop or control worry, avoidance of situations that provoke fear and anxiety, feeling fidgety or restless and difficulty with attention/concentration. She will likely benefit from a medication evaluation to assess if psychotropic medications could be helpful in treating symptoms. Pt's symptoms are not well controlled at this time. She will also likely benefit from brief and solution-focused consultation to address cognitive and behavioral interventions for depression and anxiety symptoms. Pt was in agreement with recommendations. PHQ Scores 9/28/2021 1/5/2021 3/9/2020 9/7/2017 9/7/2017   PHQ2 Score 3 4 1 0 0   PHQ9 Score 15 15 1 0 0     Interpretation of Total Score Depression Severity: 1-4 = Minimal depression, 5-9 = Mild depression, 10-14 = Moderate depression, 15-19 = Moderately severe depression, 20-27 = Severe depression    How often pt has had thoughts of death or hurting self (if PHQ positive for depression):       No flowsheet data found. Interpretation of MELANIE-7 score: 5-9 = mild anxiety, 10-14 = moderate anxiety, 15+ = severe anxiety. Recommend referral to behavioral health for scores 10 or greater. DIAGNOSIS  Gaston Vora was seen today for new patient, depression and anxiety.     Diagnoses and all orders for this visit:    Major depressive disorder, recurrent episode, moderate (HCC)    Generalized anxiety

## 2021-10-13 ENCOUNTER — TELEPHONE (OUTPATIENT)
Dept: PSYCHOLOGY | Age: 51
End: 2021-10-13

## 2021-10-13 NOTE — TELEPHONE ENCOUNTER
Patient called and left a voicemail asking for a return call. The voicemail was hard to hear and could not interpret everything she said. [facility] will return her call when she returns to the office.

## 2021-10-14 ENCOUNTER — TELEPHONE (OUTPATIENT)
Dept: PSYCHOLOGY | Age: 51
End: 2021-10-14

## 2021-10-14 ENCOUNTER — OFFICE VISIT (OUTPATIENT)
Dept: PSYCHOLOGY | Age: 51
End: 2021-10-14
Payer: COMMERCIAL

## 2021-10-14 DIAGNOSIS — F41.1 GENERALIZED ANXIETY DISORDER: ICD-10-CM

## 2021-10-14 DIAGNOSIS — F33.1 MAJOR DEPRESSIVE DISORDER, RECURRENT EPISODE, MODERATE (HCC): Primary | ICD-10-CM

## 2021-10-14 PROCEDURE — 90837 PSYTX W PT 60 MINUTES: CPT | Performed by: BEHAVIOR ANALYST

## 2021-10-14 NOTE — TELEPHONE ENCOUNTER
Returned patient's call. She asked for fax number for [employer] paperwork. Gave patient fax number for [facility] walk-in clinic.

## 2021-10-14 NOTE — PROGRESS NOTES
Henry Choe M.A. Psychology Doctoral Trainee    Supervising Clinical Psychologists:  Lucien Jaffe, Ph.D. Shanna James,  Ph.D. Lesly Cueto Psy.D. [address] [phone]      Visit Date: 10/14/2021   Time of appointment:  4:15-5:10 pm  Time spent with Patient: 55 minutes. This is patient's second appointment. Reason for Consult: Follow-up, Depression, and Anxiety     Referring Provider/PCP:    No ref. provider found  Mary Beth Daniels MD      Pt provided informed consent for the behavioral health program. Discussed with patient model of service to include the limits of confidentiality (i.e. abuse reporting, suicide intervention, etc.) and short-term intervention focused approach. Also discussed with patient that the service provider is a supervised clinician and is being supervised by Dr. Martha Barrera and/or Dr. Nurys Dhaliwal or Dr. Jean-Paul Mclean. Pt indicated understanding. Randi Gautam is a 46 y.o. female who presents for follow up of depression and anxiety. She reported she was not able to work out on her treadmill. She stated she was not motivated to walk after work. Completed values clarification exercise with pt. She identified self-esteem, fitness, inner peace, and purpose among other values as important. Discussed self-esteem and fitness. She reported that she knows if she loses weight that she will feel better. She stated she can lose weight by eating better and exercising. Ask patient what she could do in the next two weeks to work toward self-esteem. She reported that she can wake up earlier to take time for herself. She stated it is easier to wake up when coffee is already made and set on a timer to brew. Created behavioral activation plan around buying  with timer and prepping coffee the night before.      Previous Recommendations:   -Pt will walk on treadmill for 30 minutes in the morning 3x/week      MENTAL STATUS EXAM  Mood was sad with sad  and calm affect. Suicidal ideation was denied. Homicidal ideation was denied. Hygiene was good . Dress was appropriate. Behavior was Within Normal Limits with No observation or self-report of difficulties ambulating. Attitude was Cooperative. Eye-contact was fair. Speech: rate - WNL, rhythm - WNL, volume - WNL. Verbalizations were goal directed and coherent. Thought processes were intact and goal-oriented without evidence of delusions, hallucinations, obsessions, or thony. Associations were characterized by intact cognitive processes. Pt was oriented to person, place, time, and general circumstances;  recent:  good and remote:  good. Insight and judgment were estimated to be good, AEB, a good  understanding of cyclical maladaptive patterns, and the ability to use insight to inform behavior change. Latoya Maynard presented to the appointment today for evaluation and treatment of symptoms of depression and anxiety. She is currently considered to be of no risk to herself or others. Pt continues to present with symptoms consistent with a diagnosis of MDD and MELANIE. Pt will likely continue to benefit from treatment focused on depression and anxiety. Pt was in agreement with recommendations. PHQ Scores 9/28/2021 1/5/2021 3/9/2020 9/7/2017 9/7/2017   PHQ2 Score 3 4 1 0 0   PHQ9 Score 15 15 1 0 0     Interpretation of Total Score Depression Severity: 1-4 = Minimal depression, 5-9 = Mild depression, 10-14 = Moderate depression, 15-19 = Moderately severe depression, 20-27 = Severe depression    How often pt has had thoughts of death or hurting self (if PHQ positive for depression):       No flowsheet data found. Interpretation of MELANIE-7 score: 5-9 = mild anxiety, 10-14 = moderate anxiety, 15+ = severe anxiety. Recommend referral to behavioral health for scores 10 or greater.       DIAGNOSIS  Jordan Hernández was seen today for follow-up, depression and anxiety. Diagnoses and all orders for this visit:    Major depressive disorder, recurrent episode, moderate (HCC)    Generalized anxiety disorder          INTERVENTION  Discussed and set plan for behavioral activation, Discussed self-care (sleep, nutrition, rewarding activities, social support, exercise), Discussed potential barriers to change, Established rapport, Conducted functional assessment, Discussed patient's identified values, Gower-setting to identify pt's primary [facility] / overall health and Supportive techniques    Pt was engaged throughout the visit and responded well to interventions.      PLAN  -Follow up in 2 weeks  -Pt will buy coffee pot with timer and prepare coffee to be ready in the morning      Electronically signed by Riley Almendarez on 10/14/21 at 6:44 PM EDT

## 2021-10-19 ENCOUNTER — TELEPHONE (OUTPATIENT)
Dept: PSYCHOLOGY | Age: 51
End: 2021-10-19

## 2021-10-19 NOTE — TELEPHONE ENCOUNTER
Patient called to confirm FMLA paperwork was faxed to clinic. Confirmed receipt of paperwork. Will send release of information for patient to sign via [employer].

## 2021-10-20 ENCOUNTER — TELEPHONE (OUTPATIENT)
Dept: PSYCHOLOGY | Age: 51
End: 2021-10-20

## 2021-10-20 NOTE — TELEPHONE ENCOUNTER
Patient called stating the release of information that was sent to her via [employer] message could not be downloaded. She stated that she could not sign it because she could not open the attachment. Resent it via email and informed her to check her email and send it back signed.

## 2021-10-21 ENCOUNTER — TELEPHONE (OUTPATIENT)
Dept: PSYCHOLOGY | Age: 51
End: 2021-10-21

## 2021-10-21 NOTE — TELEPHONE ENCOUNTER
Called patient to let her know [employer] form would be left at  for her signature. She stated she would come in today to sign.

## 2021-10-28 ENCOUNTER — TELEPHONE (OUTPATIENT)
Dept: PSYCHOLOGY | Age: 51
End: 2021-10-28

## 2021-10-28 ENCOUNTER — OFFICE VISIT (OUTPATIENT)
Dept: PSYCHOLOGY | Age: 51
End: 2021-10-28
Payer: COMMERCIAL

## 2021-10-28 DIAGNOSIS — F41.1 GENERALIZED ANXIETY DISORDER: ICD-10-CM

## 2021-10-28 DIAGNOSIS — F33.1 MAJOR DEPRESSIVE DISORDER, RECURRENT EPISODE, MODERATE (HCC): Primary | ICD-10-CM

## 2021-10-28 PROCEDURE — 90834 PSYTX W PT 45 MINUTES: CPT | Performed by: BEHAVIOR ANALYST

## 2021-10-28 NOTE — TELEPHONE ENCOUNTER
Patient called to check on status of [place] paperwork. Told patient paperwork was faxed to employer yesterday.

## 2021-10-28 NOTE — PROGRESS NOTES
Tayler Fierro M.A. Psychology Doctoral Trainee    Supervising Clinical Psychologists:  Daniel aKba, Ph.D. Ervin Sim,  Ph.D. Jarrett Fraser Psy.D. [address] [phone]      Visit Date: 10/28/2021   Time of appointment:  4:07-4:56   Time spent with Patient: 49 minutes. This is patient's third appointment. Reason for Consult: Follow-up, Depression, and Anxiety     Referring Provider/PCP:    No ref. provider found  Kalpana Louis MD      Pt provided informed consent for the behavioral health program. Discussed with patient model of service to include the limits of confidentiality (i.e. abuse reporting, suicide intervention, etc.) and short-term intervention focused approach. Also discussed with patient that the service provider is a supervised clinician and is being supervised by Dr. Juana Ramirez and/or Dr. Beth Varela or Dr. Damien Rodriguez. Pt indicated understanding. Moraima Scruggs is a 46 y.o. female who presents for follow up of depression and anxiety. She reported feeling bittersweet about the holidays since her parents aren't around. Discussed her plan to take off time from work with [employer]. She stated she planned to take off (e.g., leave work early) if she was having a particularly taxing day. Encouraged her to consider scheduling a day to take off in advance to prevent burnout. Discussed plan for behavioral activation. Pt stated she bought a coffee pot and has been setting it about every other day. Asked what she would like to work on next and she stated she needs to get sleep before she can focus on working out again. She reported difficulty \"turning off\" the racing thoughts in her head. Discussed keeping a worry journal to write in before bed. Pt practiced writing out her thoughts during the appointment.     Previous Recommendations:   Pt will buy coffee pot with timer and prepare coffee to be ready in the morning      MENTAL STATUS EXAM  Mood was sad with sad  and calm affect. Suicidal ideation was denied. Homicidal ideation was denied. Hygiene was good . Dress was appropriate. Behavior was Within Normal Limits with No observation or self-report of difficulties ambulating. Attitude was Cooperative. Eye-contact was fair. Speech: rate - WNL, rhythm - WNL, volume - WNL. Verbalizations were goal directed and coherent. Thought processes were intact and goal-oriented without evidence of delusions, hallucinations, obsessions, or thony. Associations were characterized by intact cognitive processes. Pt was oriented to person, place, time, and general circumstances;  recent:  good and remote:  good. Insight and judgment were estimated to be good, AEB, a good  understanding of cyclical maladaptive patterns, and the ability to use insight to inform behavior change. .       ASSESSMENT  Aden Ashraf presented to the appointment today for evaluation and treatment of symptoms of depression and anxiety. She is currently considered to be of no risk to herself or others. Pt continues to present with symptoms consistent with a diagnosis of MDD and MELANIE. Pt will likely continue to benefit from treatment focused on depression and anxiety. Pt was in agreement with recommendations. PHQ Scores 9/28/2021 1/5/2021 3/9/2020 9/7/2017 9/7/2017   PHQ2 Score 3 4 1 0 0   PHQ9 Score 15 15 1 0 0     Interpretation of Total Score Depression Severity: 1-4 = Minimal depression, 5-9 = Mild depression, 10-14 = Moderate depression, 15-19 = Moderately severe depression, 20-27 = Severe depression    How often pt has had thoughts of death or hurting self (if PHQ positive for depression):       No flowsheet data found. Interpretation of MELANIE-7 score: 5-9 = mild anxiety, 10-14 = moderate anxiety, 15+ = severe anxiety. Recommend referral to behavioral health for scores 10 or greater.       DIAGNOSIS  Ambrosiodevonte Ortega was seen today for follow-up,

## 2021-11-04 ENCOUNTER — TELEPHONE (OUTPATIENT)
Dept: PSYCHOLOGY | Age: 51
End: 2021-11-04

## 2021-11-04 NOTE — TELEPHONE ENCOUNTER
Spoke with patient regarding rescheduling [facility] appointment.  Rescheduled appointment for 11/18 at 4 pm.
I will SWITCH the dose or number of times a day I take the medications listed below when I get home from the hospital:  None

## 2021-11-18 ENCOUNTER — OFFICE VISIT (OUTPATIENT)
Dept: PSYCHOLOGY | Age: 51
End: 2021-11-18
Payer: COMMERCIAL

## 2021-11-18 DIAGNOSIS — F41.1 GENERALIZED ANXIETY DISORDER: ICD-10-CM

## 2021-11-18 DIAGNOSIS — F33.1 MAJOR DEPRESSIVE DISORDER, RECURRENT EPISODE, MODERATE (HCC): Primary | ICD-10-CM

## 2021-11-18 PROCEDURE — 90834 PSYTX W PT 45 MINUTES: CPT | Performed by: BEHAVIOR ANALYST

## 2021-11-18 NOTE — PROGRESS NOTES
Kinga May M.A. Psychology Doctoral Trainee    Supervising Clinical Psychologists:  Selene Harry, Ph.D. Diamante Mehta,  Ph.D. Adriano Parker Psy.D. [address] [phone]      Visit Date: 11/18/2021   Time of appointment:  4:00-4:45 pm   Time spent with Patient: 45 minutes. This is patient's fourth appointment. Reason for Consult: Follow-up, Depression, and Anxiety     Referring Provider/PCP:    No ref. provider found  Maurice Arreola MD      Pt provided informed consent for the behavioral health program. Discussed with patient model of service to include the limits of confidentiality (i.e. abuse reporting, suicide intervention, etc.) and short-term intervention focused approach. Also discussed with patient that the service provider is a supervised clinician and is being supervised by Dr. Charo Curiel and/or Dr. Mary Christie or Dr. Angelique Cabot. Pt indicated understanding. Damian Lee is a 46 y.o. female who presents for follow up of depression and anxiety. She reported that she felt like she was \"doing better\" but then her and her boyfriend broke-up. Discussed the break-up and it's effect on her. She stated she will miss the companionship and comfort of having someone else in her house. She will also miss the sense of safety. She stated she is surprised that she has not been as tearful about the break-up as she thought she would be. She stated she has felt at peace with putting herself and her needs first. She took some time off work to reorganize her house and have some time to herself. Emphasized that she is taking action toward her values of peace and compassion. Discussed other ways pt can act in accordance with her values. She stated she would like to pray more (associated with the values God's will and spirituality) and put up inspirational pictures in her bedroom (associated with the value of fitness). Pt reported she did not get a chance to start a worry journal.    Previous Recommendations:   -Pt will start worry journal      MENTAL STATUS EXAM  Mood was sad with sad  and calm affect. Suicidal ideation was denied. Homicidal ideation was denied. Hygiene was good . Dress was appropriate. Behavior was Within Normal Limits with No observation or self-report of difficulties ambulating. Attitude was Cooperative. Eye-contact was fair. Speech: rate - WNL, rhythm - WNL, volume - WNL. Verbalizations were goal directed and coherent. Thought processes were intact and goal-oriented without evidence of delusions, hallucinations, obsessions, or thony. Associations were characterized by intact cognitive processes. Pt was oriented to person, place, time, and general circumstances;  recent:  good and remote:  good. Insight and judgment were estimated to be good, AEB, a good  understanding of cyclical maladaptive patterns, and the ability to use insight to inform behavior change. .       ASSESSMENT  Rasta Vallejo presented to the appointment today for evaluation and treatment of symptoms of depression and anxiety. She is currently considered to be of no risk to herself or others. Pt continues to present with symptoms consistent with a diagnosis of MDD and MELANIE. Pt will likely continue to benefit from treatment focused on depression and anxiety. Pt was in agreement with recommendations. PHQ Scores 9/28/2021 1/5/2021 3/9/2020 9/7/2017 9/7/2017   PHQ2 Score 3 4 1 0 0   PHQ9 Score 15 15 1 0 0     Interpretation of Total Score Depression Severity: 1-4 = Minimal depression, 5-9 = Mild depression, 10-14 = Moderate depression, 15-19 = Moderately severe depression, 20-27 = Severe depression    How often pt has had thoughts of death or hurting self (if PHQ positive for depression):       No flowsheet data found. Interpretation of MELANIE-7 score: 5-9 = mild anxiety, 10-14 = moderate anxiety, 15+ = severe anxiety. Recommend referral to behavioral health for scores 10 or greater. DIAGNOSIS  Kerry Espinal was seen today for follow-up, depression and anxiety. Diagnoses and all orders for this visit:    Major depressive disorder, recurrent episode, moderate (HCC)    Generalized anxiety disorder      INTERVENTION  Discussed and set plan for behavioral activation, Discussed self-care (sleep, nutrition, rewarding activities, social support, exercise), Discussed potential barriers to change, Established rapport, Conducted functional assessment, Discussed patient's identified values, [place]-setting to identify pt's primary [facility] / overall health and Supportive techniques, Discussed behaviors/actions associated with pt's values    Pt was engaged throughout the visit and responded well to interventions.     PLAN  Follow up in 3 weeks      Electronically signed by Viviana Ibarra on 11/18/21 at 5:45 PM EST

## 2022-01-06 ENCOUNTER — VIRTUAL VISIT (OUTPATIENT)
Age: 52
End: 2022-01-06
Payer: COMMERCIAL

## 2022-01-06 DIAGNOSIS — F41.1 GENERALIZED ANXIETY DISORDER: ICD-10-CM

## 2022-01-06 DIAGNOSIS — F33.1 MAJOR DEPRESSIVE DISORDER, RECURRENT EPISODE, MODERATE (HCC): Primary | ICD-10-CM

## 2022-01-06 PROCEDURE — 90834 PSYTX W PT 45 MINUTES: CPT | Performed by: PSYCHOLOGIST

## 2022-01-06 NOTE — PROGRESS NOTES
Sharyle Lob, M.A. Psychology Doctoral Trainee    Supervising Clinical Psychologists:  Jose Jones, Ph.D. Lilia Nichole,  Ph.D. [id number]      Visit Date: 1/6/2022   Time of appointment:  3:00-3:42 pm   Time spent with Patient: 42 minutes. This is patient's fifth appointment. Reason for Consult: Follow-up, Depression, and Anxiety     Referring Provider/PCP:    No ref. provider found  Priti Rouse MD      Pt was seen for a virtual visit (using audio & video) due to the COVID-19 pandemic via Doxy. She provided informed consent for the behavioral health program and for using telehealth. Pt was alone in her home during the visit. Clinician location: [facility]'s office in [place], [place] . Discussed with patient model of service to include the limits of confidentiality (i.e. abuse reporting, suicide intervention, etc.) and short-term intervention focused approach. Discussed the risks to using telehealth (I.e., service disruptions). Also discussed with patient that the service provider is a supervised clinician and is being supervised by Dr. Donna Mobley or Dr. Brenda Zhou. Pt indicated understanding. Mike Carlson is a 46 y.o. female who presents for follow up of depression and anxiety. She reported that the holidays were difficult and she has been experiencing anger regarding her breakup. She reported she is angry that she \"wasted all these years\" with her ex-boyfriend. She reported she tries to have something to look forward to each day. She reported she has been going back to [facility] and she looks forward to that. Reviewed values the patient identified several weeks ago. She stated that the values of inner peace, purpose, and spirituality stand out as important to her for this year. She identified reading the Bible as one way she can act in accordance with these values.     Previous Recommendations:   Pt will work on taking committed action toward values      MENTAL STATUS EXAM  Mood was depressed with congruent affect. Suicidal ideation was denied. Homicidal ideation was denied. Hygiene was good . Dress was appropriate. Behavior was Within Normal Limits with No observation or self-report of difficulties ambulating. Attitude was Cooperative. Eye-contact was good. Speech: rate - WNL, rhythm - WNL, volume - WNL. Verbalizations were goal directed and coherent. Thought processes were intact and goal-oriented without evidence of delusions, hallucinations, obsessions, or thony. Associations were characterized by intact cognitive processes. Pt was oriented to person, place, time, and general circumstances;  recent:  good and remote:  good. Insight and judgment were estimated to be good, AEB, a good  understanding of cyclical maladaptive patterns, and the ability to use insight to inform behavior change. [address] [place] presented to the appointment today for evaluation and treatment of symptoms of depression and anxiety. She is currently considered to be of no risk to herself or others. Pt continues to present with symptoms consistent with a diagnosis of MDD and MELANIE. Pt will likely continue to benefit from treatment focused on depression and anxiety. Pt was in agreement with recommendations. PHQ Scores 1/6/2022 9/28/2021 1/5/2021 3/9/2020 9/7/2017 9/7/2017   PHQ2 Score 4 3 4 1 0 0   PHQ9 Score 13 15 15 1 0 0     Interpretation of Total Score Depression Severity: 1-4 = Minimal depression, 5-9 = Mild depression, 10-14 = Moderate depression, 15-19 = Moderately severe depression, 20-27 = Severe depression    How often pt has had thoughts of death or hurting self (if PHQ positive for depression):       No flowsheet data found. Interpretation of MELANIE-7 score: 5-9 = mild anxiety, 10-14 = moderate anxiety, 15+ = severe anxiety. Recommend referral to behavioral health for scores 10 or greater.       DIAGNOSIS  Jordan Obrien was seen today for follow-up, depression and anxiety. Diagnoses and all orders for this visit:    Major depressive disorder, recurrent episode, moderate ([address]    Generalized anxiety disorder          INTERVENTION  Established rapport, [place]-setting to identify pt's primary goals for [facility] visit / overall health, Supportive techniques, Emphasized self-care as important for managing overall health, Identified maladaptive thoughts and Collaboratively set goals with pt re: acting in accordance with values of inner peace, purpose, and spirituality    Pt was engaged throughout the visit and responded well to interventions.      PLAN  1) Follow up in 2 weeks  2) Pt will read her Bible      Electronically signed by Brit Powers on 1/6/22 at 3:45 PM EST

## 2022-01-20 ENCOUNTER — OFFICE VISIT (OUTPATIENT)
Age: 52
End: 2022-01-20
Payer: COMMERCIAL

## 2022-01-20 DIAGNOSIS — F41.1 GENERALIZED ANXIETY DISORDER: ICD-10-CM

## 2022-01-20 DIAGNOSIS — F33.1 MAJOR DEPRESSIVE DISORDER, RECURRENT EPISODE, MODERATE (HCC): Primary | ICD-10-CM

## 2022-01-20 PROCEDURE — 90832 PSYTX W PT 30 MINUTES: CPT | Performed by: PSYCHOLOGIST

## 2022-01-20 NOTE — PROGRESS NOTES
Valerio Hernandez M.A. Psychology Doctoral Trainee    Supervising Clinical Psychologists:  Yair Machado, Ph.D. Gab Daniels,  Ph.D. Lalith[address] R[address]      Visit Date: 1/20/2022   Time of appointment:  3:05-3:39 pm   Time spent with Patient: 34 minutes. This is patient's sixth appointment. Reason for Consult: Follow-up, Depression, and Anxiety     Referring Provider/PCP:    No ref. provider found  Aj Higgins MD      Pt was seen for a virtual visit (using audio) due to the COVID-19 pandemic via phone. She provided informed consent for the behavioral health program and for using telehealth. Pt was alone in her home during the visit. Clinician location: [facility] office in [place], [place] . Discussed with patient model of service to include the limits of confidentiality (i.e. abuse reporting, suicide intervention, etc.) and short-term intervention focused approach. Discussed the risks to using telehealth (I.e., service disruptions). Also discussed with patient that the service provider is a supervised clinician and is being supervised by Dr. Ann Marie Walsh or Dr. Tavia iTneo. Pt indicated understanding. Neida Angela is a 46 y.o. female who presents for follow up of depression and anxiety. She reported she has felt \"up and down\" the past two weeks. She stated she did not read her Bible and it was in the \"same place\" she left it. Discussed barriers to her doing activities such as reading her Bible. Pt stated that racing thoughts make it difficult to concentrate or focus. She also reported not wanting to be around people and socialize which prevents her from going out to social gatherings. Pt stated she has prescription for Zoloft that she has not started. Set plan with patient for her to start taking Zoloft and to get her covid vaccine by next [facility] appointment.     Previous Recommendations:   1) Follow up in 2 weeks  2) Pt will read her [address] STATUS EXAM  Mood was depressed with congruent affect. Suicidal ideation was denied. Homicidal ideation was denied. Hygiene was not assessed. Dress was not assessed. Behavior was Within Normal Limits with No observation or self-report of difficulties ambulating. Attitude was Cooperative. Eye-contact was not assessed. Speech: rate - WNL, rhythm - WNL, volume - WNL. Verbalizations were goal directed and coherent. Thought processes were intact and goal-oriented without evidence of delusions, hallucinations, obsessions, or thony. Associations were characterized by intact cognitive processes. Pt was oriented to person, place, time, and general circumstances;  recent:  good and remote:  good. Insight and judgment were estimated to be good, AEB, a good  understanding of cyclical maladaptive patterns, and the ability to use insight to inform behavior change. [address] [place] presented to the appointment today for evaluation and treatment of symptoms of depression and anxiety. She is currently considered to be of no risk to herself or others. Pt continues to present with symptoms consistent with a diagnosis of MDD and MELANIE. Pt will likely continue to benefit from treatment focused on depression and anxiety. Pt was in agreement with recommendations. PHQ Scores 1/6/2022 9/28/2021 1/5/2021 3/9/2020 9/7/2017 9/7/2017   PHQ2 Score 4 3 4 1 0 0   PHQ9 Score 13 15 15 1 0 0     Interpretation of Total Score Depression Severity: 1-4 = Minimal depression, 5-9 = Mild depression, 10-14 = Moderate depression, 15-19 = Moderately severe depression, 20-27 = Severe depression    How often pt has had thoughts of death or hurting self (if PHQ positive for depression):       No flowsheet data found. Interpretation of MELANIE-7 score: 5-9 = mild anxiety, 10-14 = moderate anxiety, 15+ = severe anxiety. Recommend referral to behavioral health for scores 10 or greater.       DIAGNOSIS  Shannon Feliciano was seen today for follow-up, depression and anxiety. Diagnoses and all orders for this visit:    Major depressive disorder, recurrent episode, moderate (HCC)    Generalized anxiety disorder          INTERVENTION  Provided education on the use of medication to treat  anxiety, Discussed various factors related to the development and maintenance of  depression and anxiety (including biological, cognitive, behavioral, and environmental factors), Discussed and set plan for behavioral activation, Discussed potential treatments for  depression and anxiety, Discussed self-care (sleep, nutrition, rewarding activities, social support, exercise), Discussed potential barriers to change, Established rapport, Conducted functional assessment, [place]-setting to identify pt's primary goals for [facility] visit / overall health and Supportive techniques    Pt was engaged throughout the visit and responded well to interventions.      PLAN  1) Follow up in 2 weeks  2) Pt will start taking Zoloft and will get covid vaccine by next appointment      Electronically signed by Kayla Diane on 1/20/22 at 3:53 PM EST

## 2022-02-03 ENCOUNTER — VIRTUAL VISIT (OUTPATIENT)
Age: 52
End: 2022-02-03
Payer: COMMERCIAL

## 2022-02-03 DIAGNOSIS — F33.1 MAJOR DEPRESSIVE DISORDER, RECURRENT EPISODE, MODERATE (HCC): Primary | ICD-10-CM

## 2022-02-03 DIAGNOSIS — F41.1 GENERALIZED ANXIETY DISORDER: ICD-10-CM

## 2022-02-03 PROCEDURE — 90834 PSYTX W PT 45 MINUTES: CPT | Performed by: PSYCHOLOGIST

## 2022-02-03 NOTE — PROGRESS NOTES
Jackson Cisneros M.A. Psychology Doctoral Trainee    Supervising Clinical Psychologists:  Gina Garcia, Ph.D. Elisa Estrella,  Ph.D. Royal oD [id number]      Visit Date: 2/3/2022   Time of appointment:  3:00-3:40 pm   Time spent with Patient: 40 minutes. This is patient's seventh appointment. Reason for Consult: Follow-up, Depression, and Anxiety     Referring Provider/PCP:    No ref. provider found  Nilson Christie MD      Pt was seen for a virtual visit (using audio & video) due to the COVID-19 pandemic via Doxy. She provided informed consent for the behavioral health program and for using telehealth. Pt was alone in her home during the visit. Clinician location: [facility]'s office in [place], [place] . Discussed with patient model of service to include the limits of confidentiality (i.e. abuse reporting, suicide intervention, etc.) and short-term intervention focused approach. Discussed the risks to using telehealth (I.e., service disruptions). Also discussed with patient that the service provider is a supervised clinician and is being supervised by Dr. Desiree Hernandez or Dr. Tami Montenegro. Pt indicated understanding. Adolph Garner is a 46 y.o. female who presents for follow up of depression and anxiety. She reported she has not started taking the Zoloft. She stated she has been doing \"so so\" and has periods of about an hour or two a day where she feels down. She reported she will find herself thinking about her parents or her breakup and will start to feel sad and angry. She reported when she feels this way she will often listen to music or get up and do something. Reviewed psychoeducation on emotions and avoidance. Encouraged patient to label her emotions in the moment and choose coping mechanism that will be helpful. She reported recent feelings of \"emptiness\" and stated she \"knows\" she needs to get up and do something.  Reflected that patient was attending to the feeling of emptiness and that this feeling prompted the need to feel fulfilled. Reviewed values patient identified as important and encouraged her to develop coping mechanisms and activities focused on her values. Previous Recommendations:   1) Follow up in 2 weeks  2) Pt will start taking Zoloft and will get covid vaccine by next appointment    MENTAL STATUS EXAM  Mood was depressed with congruent affect. Suicidal ideation was denied. Homicidal ideation was denied. Hygiene was not assessed. Dress was not assessed. Behavior was Within Normal Limits with No observation or self-report of difficulties ambulating. Attitude was Cooperative. Eye-contact was not assessed. Speech: rate - WNL, rhythm - WNL, volume - WNL. Verbalizations were goal directed and coherent. Thought processes were intact and goal-oriented without evidence of delusions, hallucinations, obsessions, or thony. Associations were characterized by intact cognitive processes. Pt was oriented to person, place, time, and general circumstances;  recent:  good and remote:  good. Insight and judgment were estimated to be good, AEB, a good  understanding of cyclical maladaptive patterns, and the ability to use insight to inform behavior change. [address] [facility] presented to the appointment today for evaluation and treatment of symptoms of depression and anxiety. She is currently considered to be of no risk to herself or others. Pt continues to present with symptoms consistent with a diagnosis of MDD and MELANIE. Pt will likely continue to benefit from treatment focused on depression and anxiety. Pt was in agreement with recommendations.      PHQ Scores 1/6/2022 9/28/2021 1/5/2021 3/9/2020 9/7/2017 9/7/2017   PHQ2 Score 4 3 4 1 0 0   PHQ9 Score 13 15 15 1 0 0     Interpretation of Total Score Depression Severity: 1-4 = Minimal depression, 5-9 = Mild depression, 10-14 = Moderate depression, 15-19 = Moderately severe depression, 20-27 = Severe depression    How often pt has had thoughts of death or hurting self (if PHQ positive for depression):       No flowsheet data found. Interpretation of MELANIE-7 score: 5-9 = mild anxiety, 10-14 = moderate anxiety, 15+ = severe anxiety. Recommend referral to behavioral health for scores 10 or greater. DIAGNOSIS  Mikaela Powers was seen today for follow-up, depression and anxiety. Diagnoses and all orders for this visit:    Major depressive disorder, recurrent episode, moderate (HCC)    Generalized anxiety disorder          INTERVENTION  Discussed various factors related to the development and maintenance of  depression (including biological, cognitive, behavioral, and environmental factors), Discussed self-care (sleep, nutrition, rewarding activities, social support, exercise), Conducted functional assessment, [facility]-setting to identify pt's primary goals for [facility] visit / overall health, Supportive techniques, Provided Psychoeducation re: avoidance and emotional awareness and Identified relevant behavioral strategies for targeting coping with negative emotions including behavioral activation (e.g., shoveling snow)    Pt was engaged throughout the visit and responded well to interventions.      PLAN  1) Follow up in 2 weeks  2) Pt will practice labeling emotions and use adaptive coping mechanisms      Electronically signed by Moustapha Pitt on 2/3/22 at 5:02 PM EST

## 2022-02-17 ENCOUNTER — TELEMEDICINE (OUTPATIENT)
Age: 52
End: 2022-02-17
Payer: COMMERCIAL

## 2022-02-17 DIAGNOSIS — F33.1 MAJOR DEPRESSIVE DISORDER, RECURRENT EPISODE, MODERATE (HCC): Primary | ICD-10-CM

## 2022-02-17 DIAGNOSIS — F41.1 GENERALIZED ANXIETY DISORDER: ICD-10-CM

## 2022-02-17 PROCEDURE — 90832 PSYTX W PT 30 MINUTES: CPT | Performed by: PSYCHOLOGIST

## 2022-02-17 NOTE — PROGRESS NOTES
Ros Lowe M.A. Psychology Doctoral Trainee    Supervising Clinical Psychologists:  Wesely Farley, Ph.D. Gerry Garcia,  Ph.D. Quirino [place]      Visit Date: 2/17/2022   Time of appointment:  3:03-3:38 pm   Time spent with Patient: 35 minutes. This is patient's eighth appointment. Reason for Consult: Follow-up, Anxiety, and Depression     Referring Provider/PCP:    No ref. provider found  Alli Krishna MD      Pt was seen for a virtual visit (using audio & video) due to the COVID-19 pandemic via Doxy. She provided informed consent for the behavioral health program and for using telehealth. Pt was alone in her home during the visit. Clinician location: [facility]'s office in [facility], [address] [place] [address] . Discussed with patient model of service to include the limits of confidentiality (i.e. abuse reporting, suicide intervention, etc.) and short-term intervention focused approach. Discussed the risks to using telehealth (I.e., service disruptions). Also discussed with patient that the service provider is a supervised clinician and is being supervised by Dr. Berneice Grijalva or Dr. Mynor Nguyen. Pt indicated understanding. Brendan Samano is a 46 y.o. female who presents for follow up of depression and anxiety. She reported she has been following a diet plan recommended by her doctor for the last two weeks. She reported she has lost 11 lbs. and is starting to feel like her \"normal self. \" Discussed what her \"normal self\" feels like. She stated she feels confident, productive and happier. Explored other things in addition to weight loss that make her feel those confident, productive, and happy. She stated she feels the same way when she feels useful and helps someone. She stated she is helping out with her cousin's wedding this summer and is looking forward to it. Pt stated she has been trying to [place] positive\" and \"brush negative thoughts aside. \" Discussed difference between avoidance, reframing (cognitive reappraisal), and awareness about thoughts. Pt stated she has been trying to acknowledge negative thoughts and feelings nonjudgmentally instead of avoiding them. She stated she understands that avoiding \"reality\" can lead to depression down the road. Previous Recommendations:   1) Follow up in 2 weeks  2) Pt will practice labeling emotions and use adaptive coping mechanisms      MENTAL STATUS EXAM  Mood was within normal limits with congruent affect. Suicidal ideation was denied. Homicidal ideation was denied. Hygiene was not assessed. Dress was not assessed. Behavior was Within Normal Limits with No observation or self-report of difficulties ambulating. Attitude was Cooperative. Eye-contact was not assessed. Speech: rate - WNL, rhythm - WNL, volume - WNL. Verbalizations were goal directed and coherent. Thought processes were intact and goal-oriented without evidence of delusions, hallucinations, obsessions, or thony. Associations were characterized by intact cognitive processes. Pt was oriented to person, place, time, and general circumstances;  recent:  good and remote:  good. Insight and judgment were estimated to be good, AEB, a good  understanding of cyclical maladaptive patterns, and the ability to use insight to inform behavior change. [address] [facility] presented to the appointment today for evaluation and treatment of symptoms of depression and anxiety. She is currently considered to be of no risk to herself or others. Pt continues to present with symptoms consistent with a diagnosis of MDD and MELANIE. Pt will likely continue to benefit from treatment focused on depression and anxiety. Pt was in agreement with recommendations.      PHQ Scores 1/6/2022 9/28/2021 1/5/2021 3/9/2020 9/7/2017 9/7/2017   PHQ2 Score 4 3 4 1 0 0   PHQ9 Score 13 15 15 1 0 0     Interpretation of Total Score Depression Severity: 1-4 = Minimal depression, 5-9 = Mild depression, 10-14 = Moderate depression, 15-19 = Moderately severe depression, 20-27 = Severe depression    How often pt has had thoughts of death or hurting self (if PHQ positive for depression):       No flowsheet data found. Interpretation of MELANIE-7 score: 5-9 = mild anxiety, 10-14 = moderate anxiety, 15+ = severe anxiety. Recommend referral to behavioral health for scores 10 or greater. DIAGNOSIS  Duane Francisco was seen today for follow-up, anxiety and depression. Diagnoses and all orders for this visit:    Major depressive disorder, recurrent episode, moderate (HCC)    Generalized anxiety disorder          INTERVENTION  Discussed potential barriers to change, Supportive techniques, Emphasized self-care as important for managing overall health, Cognitive strategies to target avoidance including cognitive reappraisal and thought monitoring and Identified maladaptive thoughts    Pt was engaged throughout the visit and responded well to interventions.      PLAN  1) Follow up in 2 weeks  2) Plan on working on behavioral activation in accordance with pt's values next appointment      Electronically signed by Colleen Banks on 2/17/22 at 5:02 PM EST

## 2022-02-22 ENCOUNTER — TELEPHONE (OUTPATIENT)
Age: 52
End: 2022-02-22

## 2022-02-22 NOTE — TELEPHONE ENCOUNTER
Left voicemail for patient regarding next [facility] appointment scheduled for 2/24 at 4 pm. Left call back number.

## 2022-02-24 ENCOUNTER — TELEMEDICINE (OUTPATIENT)
Age: 52
End: 2022-02-24
Payer: COMMERCIAL

## 2022-02-24 DIAGNOSIS — F33.1 MAJOR DEPRESSIVE DISORDER, RECURRENT EPISODE, MODERATE (HCC): Primary | ICD-10-CM

## 2022-02-24 DIAGNOSIS — F41.1 GENERALIZED ANXIETY DISORDER: ICD-10-CM

## 2022-02-24 PROCEDURE — 90832 PSYTX W PT 30 MINUTES: CPT | Performed by: PSYCHOLOGIST

## 2022-02-25 NOTE — PROGRESS NOTES
Hilda Fisher M.A. Psychology Doctoral Trainee    Supervising Clinical Psychologists:  Jerson Gusman, Ph.D. Елена Uribe,  Ph.D. Sarymitul [place]      Visit Date: 2/24/2022   Time of appointment:  4:00-4:37 pm   Time spent with Patient: 37 minutes. This is patient's ninth appointment. Reason for Consult: Follow-up, Depression, and Anxiety     Referring Provider/PCP:    No ref. provider found  Cornelio Nissen, MD      Pt provided informed consent for the behavioral health program. Discussed with patient model of service to include the limits of confidentiality (i.e. abuse reporting, suicide intervention, etc.) and short-term intervention focused approach. Also discussed with patient that the service provider is a supervised clinician and is being supervised by Dr. Pia Alegria or Dr. Kvng Ji. Pt indicated understanding. Carin Ibarra is a 46 y.o. female who presents for follow up of depression and anxiety. She reported she has been doing [place]. \" She stated work is going well and she has been going to [employer] regularly. She reported she has been thinking about the break up of her relationship and wondering if she could have done something different or if she should not have broken up with her boyfriend. She stated she knows it was the right decision but she misses the company and is lonely. Discussed what she would like from a . She stated she enjoys the \"simple things\" and would want to be around someone who has the same values and enjoys the same things. Previous Recommendations:   1) Follow up in 2 weeks  2) Plan on working on behavioral activation in accordance with pt's values next appointment    [address] [place] [address] was within normal limits with congruent affect. Suicidal ideation was denied. Homicidal ideation was denied. Hygiene was not assessed. Dress was not assessed.    Behavior was Within Normal Limits with No observation or self-report of difficulties ambulating. Attitude was Cooperative. Eye-contact was not assessed. Speech: rate - WNL, rhythm - WNL, volume - WNL. Verbalizations were goal directed and coherent. Thought processes were intact and goal-oriented without evidence of delusions, hallucinations, obsessions, or thony. Associations were characterized by intact cognitive processes. Pt was oriented to person, place, time, and general circumstances;  recent:  good and remote:  good. Insight and judgment were estimated to be good, AEB, a good  understanding of cyclical maladaptive patterns, and the ability to use insight to inform behavior change. [facility] [facility] presented to the appointment today for evaluation and treatment of symptoms of depression and anxiety. She is currently considered to be of no risk to herself or others. Pt continues to present with symptoms consistent with a diagnosis of MDD and MELANIE. Pt will likely continue to benefit from treatment focused on depression and anxiety. Pt was in agreement with recommendations. PHQ Scores 1/6/2022 9/28/2021 1/5/2021 3/9/2020 9/7/2017 9/7/2017   PHQ2 Score 4 3 4 1 0 0   PHQ9 Score 13 15 15 1 0 0     Interpretation of Total Score Depression Severity: 1-4 = Minimal depression, 5-9 = Mild depression, 10-14 = Moderate depression, 15-19 = Moderately severe depression, 20-27 = Severe depression    How often pt has had thoughts of death or hurting self (if PHQ positive for depression):       No flowsheet data found. Interpretation of MELANIE-7 score: 5-9 = mild anxiety, 10-14 = moderate anxiety, 15+ = severe anxiety. Recommend referral to behavioral health for scores 10 or greater. DIAGNOSIS  Sonya Montgomery was seen today for follow-up, depression and anxiety.     Diagnoses and all orders for this visit:    Major depressive disorder, recurrent episode, moderate (HCC)    Generalized anxiety disorder          INTERVENTION  Motivational Interviewing to determine importance and readiness for change, Established rapport, Supportive techniques and Emphasized self-care as important for managing overall health    Pt was engaged throughout the visit and responded well to interventions.      PLAN  Follow up in 2 weeks      Electronically signed by Colleen Banks on 2/25/22 at 3:05 PM EST

## 2022-03-17 ENCOUNTER — TELEMEDICINE (OUTPATIENT)
Age: 52
End: 2022-03-17
Payer: COMMERCIAL

## 2022-03-17 DIAGNOSIS — F33.1 MAJOR DEPRESSIVE DISORDER, RECURRENT EPISODE, MODERATE (HCC): Primary | ICD-10-CM

## 2022-03-17 DIAGNOSIS — F41.1 GENERALIZED ANXIETY DISORDER: ICD-10-CM

## 2022-03-17 PROCEDURE — 90834 PSYTX W PT 45 MINUTES: CPT | Performed by: PSYCHOLOGIST

## 2022-03-17 NOTE — PROGRESS NOTES
Denzel Ricketts M.A. Psychology Doctoral Trainee    Supervising Clinical Psychologists:  Viji Call, Ph.D. Kesha Pisano,  Ph.D. Priya Sweet      Visit Date: 3/17/2022   Time of appointment:  4:00-4:40 pm   Time spent with Patient: 40 minutes. This is patient's tenth appointment. Reason for Consult: Follow-up, Depression, and Anxiety     Referring Provider/PCP:    No ref. provider found  Unknown MD Digna      Pt was seen for a virtual visit (using audio & video) due to the COVID-19 pandemic via Doxy. She provided informed consent for the behavioral health program and for using telehealth. Pt was alone in her home during the visit. Clinician location: [facility]'s office in [place], [place] . Discussed with patient model of service to include the limits of confidentiality (i.e. abuse reporting, suicide intervention, etc.) and short-term intervention focused approach. Discussed the risks to using telehealth (I.e., service disruptions). Also discussed with patient that the service provider is a supervised clinician and is being supervised by Dr. Francisco Blum or Dr. Karyn Zafar. Pt indicated understanding. Levi Ross is a 46 y.o. female who presents for follow up of depression and anxiety. She reported she has been \"doing good. \" She has lost a total of 20 lbs since she started taking weight loss pills (Adipex) in February. She stated she feels better and is able to fit into some of her clothes. Discussed her values she identified months ago including health and fitness. Reviewed values and pt identified self-esteem, self-acceptance, and spirituality as values she wants to work on. She stated self-care, praying and reading her Bible are ways she can live in accordance with those values. Previous Recommendations:   -follow up in 2 weeks      [facility] was within normal limits with congruent affect. Suicidal ideation was denied. Homicidal ideation was denied. Hygiene was not assessed. Dress was not assessed. Behavior was Within Normal Limits with No observation or self-report of difficulties ambulating. Attitude was Cooperative. Eye-contact was not assessed. Speech: rate - WNL, rhythm - WNL, volume - WNL. Verbalizations were goal directed and coherent. Thought processes were intact and goal-oriented without evidence of delusions, hallucinations, obsessions, or thony. Associations were characterized by intact cognitive processes. Pt was oriented to person, place, time, and general circumstances;  recent:  good and remote:  good. Insight and judgment were estimated to be good, AEB, a good  understanding of cyclical maladaptive patterns, and the ability to use insight to inform behavior change. [address] [facility] presented to the appointment today for evaluation and treatment of symptoms of depression and anxiety. She is currently considered to be of no risk to herself or others. Pt continues to present with symptoms consistent with a diagnosis of MDD and MELANIE. Pt will likely continue to benefit from treatment focused on depression and anxiety. Pt was in agreement with recommendations. PHQ Scores 1/6/2022 9/28/2021 1/5/2021 3/9/2020 9/7/2017 9/7/2017   PHQ2 Score 4 3 4 1 0 0   PHQ9 Score 13 15 15 1 0 0     Interpretation of Total Score Depression Severity: 1-4 = Minimal depression, 5-9 = Mild depression, 10-14 = Moderate depression, 15-19 = Moderately severe depression, 20-27 = Severe depression    How often pt has had thoughts of death or hurting self (if PHQ positive for depression):       No flowsheet data found. Interpretation of MELANIE-7 score: 5-9 = mild anxiety, 10-14 = moderate anxiety, 15+ = severe anxiety. Recommend referral to behavioral health for scores 10 or greater. DIAGNOSIS  Hailee Cooper was seen today for follow-up, depression and anxiety.     Diagnoses and all orders for this visit:    Major depressive disorder, recurrent episode, moderate (HCC)    Generalized anxiety disorder          INTERVENTION  Discussed self-care (sleep, nutrition, rewarding activities, social support, exercise), Established rapport, Supportive techniques, Emphasized self-care as important for managing overall health, Identified maladaptive thoughts and Reviewed values clarification exercise and identified behaviors that align with pt's important values    Pt was engaged throughout the visit and responded well to interventions.      PLAN  Follow up in 3 weeks      Electronically signed by Devon Edgar on 3/17/22 at 4:49 PM EDT

## 2022-04-07 ENCOUNTER — TELEMEDICINE (OUTPATIENT)
Age: 52
End: 2022-04-07
Payer: COMMERCIAL

## 2022-04-07 DIAGNOSIS — F33.1 MAJOR DEPRESSIVE DISORDER, RECURRENT EPISODE, MODERATE (HCC): Primary | ICD-10-CM

## 2022-04-07 DIAGNOSIS — F41.1 GENERALIZED ANXIETY DISORDER: ICD-10-CM

## 2022-04-07 PROCEDURE — 90834 PSYTX W PT 45 MINUTES: CPT | Performed by: PSYCHOLOGIST

## 2022-04-07 NOTE — PROGRESS NOTES
Juan Jose Fragoso M.A. Psychology Doctoral Trainee    Supervising Clinical Psychologists:  Kortney Duncan, Ph.D. Becky Alfred,  Ph.D. [facility] [address]      Visit Date: 4/7/2022   Time of appointment: 4:00-4:45 pm   Time spent with Patient: 45 minutes. This is patient's 11th appointment. Reason for Consult: Follow-up, Depression, and Anxiety     Referring Provider/PCP:    No ref. provider found  Maryjane Wallis MD      Pt was seen for a virtual visit (using audio & video) due to the COVID-19 pandemic via Doxy. She provided informed consent for the behavioral health program and for using telehealth. Pt was alone in her home during the visit. Clinician location: [facility]'s office in [place], [place] . Discussed with patient model of service to include the limits of confidentiality (i.e. abuse reporting, suicide intervention, etc.) and short-term intervention focused approach. Discussed the risks to using telehealth (I.e., service disruptions). Also discussed with patient that the service provider is a supervised clinician and is being supervised by Dr. Tim Britt or Dr. Janie Gomez. Pt indicated understanding. .     Osmin Navarro is a 46 y.o. female who presents for follow up of depression and anxiety. She reported she has been \"doing well. \" She has lost almost 30 lbs toward her goal of losing 55 lbs. Emphasized the hard work and determination pt has put in to get to this point. Asked how she was rewarding herself for weight loss thus far. She stated she has been shopping for new clothes. She stated when she gets to her final goal she want to go out to dinner and go to a jazz club. Discussed options for transferring treatment to another mental health provider as this [facility] is leaving at the end of May. Pt stated she was interested in staying with a [facility] at [facility]. Will discuss further next appointment. Previous Recommendations:    Follow up in 3 weeks        MENTAL STATUS EXAM  Mood was within normal limits with congruent affect. Suicidal ideation was denied. Homicidal ideation was denied. Hygiene was not assessed. Dress was not assessed. Behavior was Within Normal Limits with No observation or self-report of difficulties ambulating. Attitude was Cooperative. Eye-contact was not assessed. Speech: rate - WNL, rhythm - WNL, volume - WNL. Verbalizations were goal directed and coherent. Thought processes were intact and goal-oriented without evidence of delusions, hallucinations, obsessions, or thony. Associations were characterized by intact cognitive processes. Pt was oriented to person, place, time, and general circumstances;  recent:  good and remote:  good. Insight and judgment were estimated to be good, AEB, a good  understanding of cyclical maladaptive patterns, and the ability to use insight to inform behavior change. [address] [place] presented to the appointment today for evaluation and treatment of symptoms of depression and anxiety. She is currently considered to be of no risk to herself or others. Pt continues to present with symptoms consistent with a diagnosis of MDD and MELANIE. Pt will likely continue to benefit from treatment focused on depression and anxiety. Pt was in agreement with recommendations. PHQ Scores 1/6/2022 9/28/2021 1/5/2021 3/9/2020 9/7/2017 9/7/2017   PHQ2 Score 4 3 4 1 0 0   PHQ9 Score 13 15 15 1 0 0     Interpretation of Total Score Depression Severity: 1-4 = Minimal depression, 5-9 = Mild depression, 10-14 = Moderate depression, 15-19 = Moderately severe depression, 20-27 = Severe depression    How often pt has had thoughts of death or hurting self (if PHQ positive for depression):       No flowsheet data found. Interpretation of MELANIE-7 score: 5-9 = mild anxiety, 10-14 = moderate anxiety, 15+ = severe anxiety. Recommend referral to behavioral health for scores 10 or greater.       DIAGNOSIS  Anthony Schuster was seen today for follow-up, depression and anxiety. Diagnoses and all orders for this visit:    Major depressive disorder, recurrent episode, moderate (HCC)    Generalized anxiety disorder          INTERVENTION  Discussed potential treatments for  depression and anxiety, [place]-setting to identify pt's primary goals for [facility] visit / overall health, Supportive techniques, Emphasized self-care as important for managing overall health and Collaborative treatment planning,Clarified role of [facility] in primary care,Recommended that pt establish with a mental health clinician with whom they can meet regularly for psychotherapy services    Pt was engaged throughout the visit and responded well to interventions. PLAN  Follow up in 1 month.       Electronically signed by Lenny Lewis on 4/7/22 at 4:51 PM EDT

## 2022-04-12 ENCOUNTER — TELEPHONE (OUTPATIENT)
Age: 52
End: 2022-04-12

## 2022-05-05 ENCOUNTER — TELEMEDICINE (OUTPATIENT)
Age: 52
End: 2022-05-05
Payer: COMMERCIAL

## 2022-05-05 DIAGNOSIS — F41.1 GENERALIZED ANXIETY DISORDER: ICD-10-CM

## 2022-05-05 DIAGNOSIS — F33.1 MAJOR DEPRESSIVE DISORDER, RECURRENT EPISODE, MODERATE (HCC): Primary | ICD-10-CM

## 2022-05-05 PROCEDURE — 90832 PSYTX W PT 30 MINUTES: CPT | Performed by: PSYCHOLOGIST

## 2022-05-05 NOTE — PROGRESS NOTES
Rosa Elena Piedra M.A. Psychology Doctoral Trainee    Supervising Clinical Psychologists:  Claude Norrie, Ph.D. Tex Holley,  Ph.D. Marion Lewis [address]      Visit Date: 5/5/2022   Time of appointment: 4:00-4:30 pm   Time spent with Patient: 30 minutes. This is patient's 12th appointment. Reason for Consult: Follow-up, Depression, and Anxiety     Referring Provider/PCP:    No ref. provider found  Alma Arreguin MD      Pt was seen for a virtual visit (using audio & video) due to the COVID-19 pandemic via Doxy. She provided informed consent for the behavioral health program and for using telehealth. Pt was alone in her home during the visit. Clinician location: [facility] office in [facility] . Discussed with patient model of service to include the limits of confidentiality (i.e. abuse reporting, suicide intervention, etc.) and short-term intervention focused approach. Discussed the risks to using telehealth (I.e., service disruptions). Also discussed with patient that the service provider is a supervised clinician and is being supervised by Dr. Jacqulynn Nageotte or Dr. Beth Cochran. Pt indicated understanding. Jalen Samayoa is a 46 y.o. female who presents for follow up of depression and anxiety. Pt reported she has been doing well. She is currently down 39 lbs and working toward her goal of losing a total of 55 lbs. She stated more people at work have started to notice her weight loss. She stated she feels strong and empowered. Discussed pt's progress in therapy thus far. Pt has seen decreased depression and anxiety symptoms over time. Emphasized that the pt has the skills to get out of a \"rut\" and encouraged her to lean on those skills. Pt expressed interest in continuing therapy via telehealth with a female [facility]. Will reach out once schedule becomes available. Previous Recommendations: Follow up in 1 month.       MENTAL STATUS EXAM  Mood was within normal limits with congruent affect. Suicidal ideation was denied. Homicidal ideation was denied. Hygiene was not assessed. Dress was not assessed. Behavior was Within Normal Limits with No observation or self-report of difficulties ambulating. Attitude was Cooperative. Eye-contact was not assessed. Speech: rate - WNL, rhythm - WNL, volume - WNL. Verbalizations were goal directed and coherent. Thought processes were intact and goal-oriented without evidence of delusions, hallucinations, obsessions, or thony. Associations were characterized by intact cognitive processes. Pt was oriented to person, place, time, and general circumstances;  recent:  good and remote:  good. Insight and judgment were estimated to be good, AEB, a good  understanding of cyclical maladaptive patterns, and the ability to use insight to inform behavior change. [address] [place] presented to the appointment today for evaluation and treatment of symptoms of depression and anxiety. She is currently considered to be of no risk to herself or others. Pt continues to present with symptoms consistent with a diagnosis of MDD and MELANIE. Pt will likely continue to benefit from treatment focused on depression and anxiety. Pt was in agreement with recommendations. PHQ Scores 1/6/2022 9/28/2021 1/5/2021 3/9/2020 9/7/2017 9/7/2017   PHQ2 Score 4 3 4 1 0 0   PHQ9 Score 13 15 15 1 0 0     Interpretation of Total Score Depression Severity: 1-4 = Minimal depression, 5-9 = Mild depression, 10-14 = Moderate depression, 15-19 = Moderately severe depression, 20-27 = Severe depression    How often pt has had thoughts of death or hurting self (if PHQ positive for depression):       No flowsheet data found. Interpretation of MELANIE-7 score: 5-9 = mild anxiety, 10-14 = moderate anxiety, 15+ = severe anxiety. Recommend referral to behavioral health for scores 10 or greater.       DIAGNOSIS  America Sutherland was seen today for follow-up, depression and anxiety. Diagnoses and all orders for this visit:    Major depressive disorder, recurrent episode, moderate (HCC)    Generalized anxiety disorder          INTERVENTION  Discussed various factors related to the development and maintenance of  depression (including biological, cognitive, behavioral, and environmental factors), Discussed self-care (sleep, nutrition, rewarding activities, social support, exercise), Supportive techniques and Emphasized self-care as important for managing overall health    Pt was engaged throughout the visit and responded well to interventions. PLAN  Plan to follow up with another (female) [facility] provider at [address] [place] [address]. Pt will wait to receive call to schedule with provider.       Electronically signed by Benjamin Smith on 5/5/22 at 4:53 PM EDT

## 2022-06-20 ENCOUNTER — TELEMEDICINE (OUTPATIENT)
Dept: BEHAVIORAL/MENTAL HEALTH CLINIC | Age: 52
End: 2022-06-20
Payer: COMMERCIAL

## 2022-06-20 DIAGNOSIS — F33.1 MAJOR DEPRESSIVE DISORDER, RECURRENT EPISODE, MODERATE (HCC): Primary | ICD-10-CM

## 2022-06-20 DIAGNOSIS — F41.1 GAD (GENERALIZED ANXIETY DISORDER): ICD-10-CM

## 2022-06-20 PROCEDURE — 90834 PSYTX W PT 45 MINUTES: CPT | Performed by: PSYCHOLOGIST

## 2022-06-20 NOTE — PROGRESS NOTES
ADULT BEHAVIORAL HEALTH FOLLOW UP  [place], [place]. Psychology Doctoral Trainee    Supervising Clinical Psychologists:  Max Richardson, Ph.D. Devon Pettit,  Ph.D. Ivan Myers          Visit Date: 6/20/2022   Time of appointment:  3:13-3:57pm   Time spent with Patient: 44 minutes. This is patient's 13th appointment. Reason for Consult:  Depression, Anxiety, and Follow-up     Referring Provider/PCP:    No ref. provider found  Paul Cazares MD      Pt was seen for a virtual visit (using audio & video) due to the COVID-19 pandemic via Doxy. She provided informed consent for the behavioral health program and for using telehealth. Pt was alone in her home during the visit. Discussed with patient model of service to include the limits of confidentiality (i.e. abuse reporting, suicide intervention, etc.) and short-term intervention focused approach. Discussed the risks to using telehealth (i.e., service disruptions). Also discussed with patient that the service provider is a supervised clinician and is being supervised by Dr. Willis Rocha    Pt indicated understanding. Clinician location: [address] [place] [address] office in [place], [place] . Mike Morton is a 46 y.o. female who presents for follow up of depression and anxiety. She was a patient of the previous [facility], Grazyna Stone, and this is the first appointment with the current [facility]. She reported that she has been having a \"lower day\" today and that she did not feel like it was worth getting out of bed in the morning. Gerson Guadarrama described feeling confused as to why she is feeling low and having \"negative\" thoughts today when she had a great time at a cookout with family yesterday. Gerson Chiara reported that for self-care she ate a healthy meal of yogurt and mixed nuts this morning and plans on using her air fryer to make a healthy meal later, and plans to watch a movie on Netflix later.  Gerson Chiara agreed to contact her PCP as homework to discuss starting 3 4 1 0 0   PHQ9 Score 13 15 15 1 0 0     Interpretation of Total Score Depression Severity: 1-4 = Minimal depression, 5-9 = Mild depression, 10-14 = Moderate depression, 15-19 = Moderately severe depression, 20-27 = Severe depression    How often pt has had thoughts of death or hurting self (if PHQ positive for depression):       No flowsheet data found. Interpretation of MELANIE-7 score: 5-9 = mild anxiety, 10-14 = moderate anxiety, 15+ = severe anxiety. Recommend referral to behavioral health for scores 10 or greater. DIAGNOSIS  Luis M Mullen was seen today for depression, anxiety and follow-up. Diagnoses and all orders for this visit:    Major depressive disorder, recurrent episode, moderate (HCC)    MELANIE (generalized anxiety disorder)          INTERVENTION  Established rapport, Supportive techniques, Emphasized self-care as important for managing overall health and Identified maladaptive thoughts      PLAN  1. Follow-up with [facility] in 3 weeks   2. Pt agreed to call her PCP to discuss starting her prescribed medications (Zoloft & Buspar) to help manage symptoms of depression and anxiety      INTERACTIVE COMPLEXITY  Is interactive complexity present?   No  Reason:  N/A  Additional Supporting Information:  N/A       Electronically signed by Sloane Shell on 6/20/22 at 4:59 PM EDT

## 2022-07-06 PROBLEM — F33.1 MAJOR DEPRESSIVE DISORDER, RECURRENT EPISODE, MODERATE (HCC): Status: ACTIVE | Noted: 2022-07-06

## 2022-07-06 PROBLEM — F41.1 GAD (GENERALIZED ANXIETY DISORDER): Status: ACTIVE | Noted: 2022-07-06

## 2022-07-11 ENCOUNTER — TELEMEDICINE (OUTPATIENT)
Dept: BEHAVIORAL/MENTAL HEALTH CLINIC | Age: 52
End: 2022-07-11
Payer: COMMERCIAL

## 2022-07-11 DIAGNOSIS — F33.1 MAJOR DEPRESSIVE DISORDER, RECURRENT EPISODE, MODERATE (HCC): Primary | ICD-10-CM

## 2022-07-11 DIAGNOSIS — F41.1 GAD (GENERALIZED ANXIETY DISORDER): ICD-10-CM

## 2022-07-11 PROCEDURE — 90834 PSYTX W PT 45 MINUTES: CPT | Performed by: PSYCHOLOGIST

## 2022-07-11 NOTE — PROGRESS NOTES
ADULT BEHAVIORAL HEALTH FOLLOW UP  [place], [place]. Psychology Doctoral Trainee    Supervising Clinical Psychologists:  Nichol Zafar, Ph.D. Elvira Curtis,  Ph.D. George Ceja          Visit Date: 7/11/2022   Time of appointment:  3:59-4:41pm   Time spent with Patient: 42 minutes. This is patient's 14th appointment. Reason for Consult:  Depression, Anxiety, Stress, and Follow-up     Referring Provider/PCP:    No ref. provider found  Sarah Jerome MD      Pt was seen for a virtual visit (using audio & video) due to the COVID-19 pandemic via Doxy. She provided informed consent for the behavioral health program and for using telehealth. Pt was alone in her home during the visit. Discussed with patient model of service to include the limits of confidentiality (i.e. abuse reporting, suicide intervention, etc.) and short-term intervention focused approach. Discussed the risks to using telehealth (i.e., service disruptions).    Also discussed with patient that the service provider is a supervised clinician and is being supervised by Dr. Rivera Montejo indicated understanding.     Clinician location: [address] office in [facility] . Vertis Oppenheim is a 46 y.o. female who presents for follow up of depression and anxiety. She reported that she has been \"tired\" and took a half day from work in order to go home and try to catch up on sleep. Esther Vega described difficulties with sleeping the past two weeks, specifically tossing and turning at night and being unable to fall asleep despite fatigue. Esther Vega and the [facility] discussed sleep hygiene practices to help encourage sleep (e.g., limiting electronic device screen-time before bed &upon waking; consistent wake & sleep time; etc.) and she agreed to try to keep a sleep diary by journaling about her sleep practices.  She also mentioned that her coworkers suggested melatonin to aid sleeping at night, but wants to wait to talk to her PCP first. For self-care engagement, Geraldine Barajas mentioned trying to catch up on sleep and finishing one of her favorite movies \"[place]up[place]\". Previous Recommendations:   1. Follow-up with [facility] in 3 weeks   2. Pt agreed to call her PCP to discuss starting her prescribed medications (Zoloft & Buspar) to help manage symptoms of depression and anxiety      MENTAL STATUS EXAM  Mood was euthymic and within normal limits with congruent affect. Suicidal ideation was denied. Homicidal ideation was denied. Hygiene was good . Dress was appropriate. Behavior was Within Normal Limits with No observation or self-report of difficulties ambulating. Attitude was Cooperative. Eye-contact was good. Speech: rate - WNL, rhythm - WNL, volume - WNL. Verbalizations were goal directed and coherent. Thought processes were intact and goal-oriented without evidence of delusions, hallucinations, obsessions, or thony; with no cognitive distortions. Associations were characterized by intact cognitive processes. Pt was oriented to person, place, time, and general circumstances;  recent:  good. Insight and judgment were estimated to be fair, AEB, a fair  understanding of cyclical maladaptive patterns, and the ability to use insight to inform behavior change. Attention span and concentration appear within functional limits  Language use and knowledge base appear within functional limits        [address] presented to the appointment today for evaluation and treatment of symptoms of depression and anxi. She is currently deemed no risk to herself or others and meets criteria for major depressive disorder, recurrent episode, moderate and generalized anxiety disorder. She will continue to benefit from treatment focused on depression and anxiety. Geraldine Barajas was in agreement with recommendations.       PHQ Scores 1/6/2022 9/28/2021 1/5/2021 3/9/2020 9/7/2017 9/7/2017   PHQ2 Score 4 3 4 1 0 0   PHQ9 Score 13 15 15 1 0 0 Interpretation of Total Score Depression Severity: 1-4 = Minimal depression, 5-9 = Mild depression, 10-14 = Moderate depression, 15-19 = Moderately severe depression, 20-27 = Severe depression    How often pt has had thoughts of death or hurting self (if PHQ positive for depression):       No flowsheet data found. Interpretation of MELANIE-7 score: 5-9 = mild anxiety, 10-14 = moderate anxiety, 15+ = severe anxiety. Recommend referral to behavioral health for scores 10 or greater. DIAGNOSIS  [place] was seen today for depression, anxiety, stress and follow-up. Diagnoses and all orders for this visit:    Major depressive disorder, recurrent episode, moderate (HCC)    MELANIE (generalized anxiety disorder)          INTERVENTION  Provided education on the use of medication to treat  insomnia, Provided education, Discussed self-care (sleep, nutrition, rewarding activities, social support, exercise), Provided sleep diary and instructions, Established rapport, Supportive techniques, Emphasized self-care as important for managing overall health and Reviewed Sleep Hygiene tips including: establishing consistent sleep & wake time, limiting screentime 1 hour before sleep, and reducing the room temperature. PLAN  1. [place] will use a journal as a sleep diary to track when she goes to bed, gets up in the morning, and her sleep quality (I.e., duration, disruptions, etc.)   2. [place] agreed to call her PCP to discuss starting her prescribed medications (Zoloft & Buspar) to help manage symptoms of depression and anxiety, as well as discuss medications for insomnia or over-the-counter remedies, such as melatonin. 3. Follow-up with [facility] in 2 weeks. INTERACTIVE COMPLEXITY  Is interactive complexity present?   No  Reason:  N/A  Additional Supporting Information:  N/A       Electronically signed by Calderon Page on 7/11/22 at 4:57 PM EDT

## 2022-07-25 ENCOUNTER — TELEMEDICINE (OUTPATIENT)
Dept: BEHAVIORAL/MENTAL HEALTH CLINIC | Age: 52
End: 2022-07-25
Payer: COMMERCIAL

## 2022-07-25 DIAGNOSIS — F33.1 MAJOR DEPRESSIVE DISORDER, RECURRENT EPISODE, MODERATE (HCC): Primary | ICD-10-CM

## 2022-07-25 DIAGNOSIS — F41.1 GAD (GENERALIZED ANXIETY DISORDER): ICD-10-CM

## 2022-07-25 PROCEDURE — 90834 PSYTX W PT 45 MINUTES: CPT | Performed by: PSYCHOLOGIST

## 2022-07-25 NOTE — PROGRESS NOTES
ADULT BEHAVIORAL HEALTH FOLLOW UP  [place], [place]. Psychology Doctoral Trainee    Supervising Clinical Psychologists:  Cade Pino, Ph.D. Dennise Bronson,  Ph.D. [facility]          Visit Date: 7/25/2022   Time of appointment:  4-4:47pm   Time spent with Patient: 47 minutes. This is patient's 15th appointment. Reason for Consult:  Anxiety, Depression, Stress, and Follow-up     Referring Provider/PCP:    No ref. provider found  Bessie Ridley MD      Pt was seen for a virtual visit (using audio & video) due to the COVID-19 pandemic via Doxy. She provided informed consent for the behavioral health program and for using telehealth. Pt was alone in her home [facility] during the visit. Discussed with patient model of service to include the limits of confidentiality (i.e. abuse reporting, suicide intervention, etc.) and short-term intervention focused approach. Discussed the risks to using telehealth (i.e., service disruptions). Pt provided emergency contact information and verbal consent to contact her son Vanessa Prado, he/his) at [phone]. Also discussed with patient that the service provider is a supervised clinician and is being supervised by Dr. Nichole Carreno. Pt indicated understanding. [facility] [place], [place]. Sarah Rehman is a 46 y.o. female who presents for follow up of depression and anxiety. She described things as being \"pretty much the same as last time\" in terms of stress and her symptoms. She reported that she's been able to fall asleep easier at night, though still wakes up in the middle of the night. Emelia Coelho described that when she wakes up around 3am or so, it is often related to tasks such as cleaning (e.g., vacuuming, doing the dishes, etc.) or checking things like the door locks and gas stove.  She explains that she sometimes feels \"crazy\" because she does not want to do these behaviors, but feels \"driven\" to do them. Morris Jackman elaborated that this doesn't happen every day, but that it's mentally exhausting and just wants to \"shut her brain off\" at the end of the day. She also described some frustrations with work, and that she plans to engage in self-care by taking a mindful shower and going to bed early tonight. Additionally, Morris Jackman and the [facility] reviewed pros and cons for moving from her current house. Previous Recommendations:   1. Morris Jackman will use a journal as a sleep diary to track when she goes to bed, gets up in the morning, and her sleep quality (I.e., duration, disruptions, etc.)  2. Morris Jackman agreed to call her PCP to discuss starting her prescribed medications (Zoloft & Buspar) to help manage symptoms of depression and anxiety, as well as discuss medications for insomnia or over-the-counter remedies, such as melatonin. 3. Follow-up with [facility] in 2 weeks. MENTAL STATUS EXAM  Mood was within normal limits with congruent affect. Suicidal ideation was denied. Homicidal ideation was denied. Hygiene was good . Dress was appropriate. Behavior was Restless & fidgety with No observation or self-report of difficulties ambulating. Attitude was Cooperative. Eye-contact was good. Speech: rate - WNL, rhythm - WNL, volume - WNL. Verbalizations were goal directed and coherent. Thought processes were intact and goal-oriented without evidence of delusions, hallucinations, obsessions, or thony; with no cognitive distortions. Associations were characterized by intact cognitive processes. Pt was oriented to person, place, time, and general circumstances;  recent:  good. Insight and judgment were estimated to be fair, AEB, a fair  understanding of cyclical maladaptive patterns, and the ability to use insight to inform behavior change.    Attention span and concentration appear within functional limits  Language use and knowledge base appear within functional limits        [address] presented to the appointment today for evaluation and treatment of symptoms of depression and anxiety. She is currently deemed no risk to herself or others and meets criteria for major depressive disorder, recurrent episode, moderate and generalized anxiety disorder. She will continue to benefit from treatment focused on depression and anxiety. Mary Acharya was in agreement with recommendations. PHQ Scores 1/6/2022 9/28/2021 1/5/2021 3/9/2020 9/7/2017 9/7/2017   PHQ2 Score 4 3 4 1 0 0   PHQ9 Score 13 15 15 1 0 0     Interpretation of Total Score Depression Severity: 1-4 = Minimal depression, 5-9 = Mild depression, 10-14 = Moderate depression, 15-19 = Moderately severe depression, 20-27 = Severe depression    How often pt has had thoughts of death or hurting self (if PHQ positive for depression):       No flowsheet data found. Interpretation of MELANIE-7 score: 5-9 = mild anxiety, 10-14 = moderate anxiety, 15+ = severe anxiety. Recommend referral to behavioral health for scores 10 or greater. DIAGNOSIS  Mary Acharya was seen today for anxiety, depression, stress and follow-up. Diagnoses and all orders for this visit:    Major depressive disorder, recurrent episode, moderate (HCC)    MELANIE (generalized anxiety disorder)        INTERVENTION  Provided education, Discussed self-care (sleep, nutrition, rewarding activities, social support, exercise), Discussed potential barriers to change, Established rapport, Supportive techniques, Emphasized self-care as important for managing overall health, and Problem-solving re: pros and cons of whether to move out of her house into an apartment      PLAN  Practice self-care daily by taking mindful showers. Follow-up with the [facility] in 2 weeks. INTERACTIVE COMPLEXITY  Is interactive complexity present?   No  Reason:  N/A  Additional Supporting Information:  N/A       Electronically signed by Dov Clark on 7/25/22 at 10:42 AM EDT

## 2022-08-08 ENCOUNTER — TELEMEDICINE (OUTPATIENT)
Dept: BEHAVIORAL/MENTAL HEALTH CLINIC | Age: 52
End: 2022-08-08
Payer: COMMERCIAL

## 2022-08-08 DIAGNOSIS — F41.1 GAD (GENERALIZED ANXIETY DISORDER): ICD-10-CM

## 2022-08-08 DIAGNOSIS — F33.1 MAJOR DEPRESSIVE DISORDER, RECURRENT EPISODE, MODERATE (HCC): Primary | ICD-10-CM

## 2022-08-08 PROCEDURE — 90834 PSYTX W PT 45 MINUTES: CPT | Performed by: PSYCHOLOGIST

## 2022-08-08 NOTE — PROGRESS NOTES
ADULT BEHAVIORAL HEALTH FOLLOW UP  [place], [place]. Psychology Doctoral Trainee    Supervising Clinical Psychologists:  Ora Aguilar, Ph.D. Sita Dennis,  Ph.D. Nemesio Monsivais          Visit Date: 8/8/2022   Time of appointment:  4:02-4:47pm    Time spent with Patient: 45 minutes. This is patient's 16th appointment. Reason for Consult:  Depression, Stress, and Follow-up     Referring Provider/PCP:    No ref. provider found  Anthony Valente MD      Pt was seen for a virtual visit (using audio & video) due to the COVID-19 pandemic via Doxy. She provided informed consent for the behavioral health program and for using telehealth. Pt was alone in her home [facility] during the visit. Discussed with patient model of service to include the limits of confidentiality (i.e. abuse reporting, suicide intervention, etc.) and short-term intervention focused approach. Discussed the risks to using telehealth (i.e., service disruptions). Pt provided emergency contact information and verbal consent to contact her son Stephanie Glover, he/his) at [phone]. Also discussed with patient that the service provider is a supervised clinician and is being supervised by Dr. Jazmin Acosta. Pt indicated understanding. Clinician location: [facility] office in [place], [place]. Teresa Arthur is a 46 y.o. female who presents for follow up of depression and anxiety. She reported that things have been \"ok\" but that she is experiencing some stress related to financial difficulties. Kerry Espinal explained that when her depressive symptoms worsen she will use FMLA to take time off, and her workplace gave everyone unpaid days off recently which have made things \"tighter\" in her budget this month and last. She described feeling frustrated that she cannot afford to distract herself as she normally would, like going out or going to the gym.  She also expressed anger and frustration with friends and family who \"turn it back\" when she attempts to open up to them and felt that she has to have it \"all-together\" because that is what people expect. She expressed gratitude for the sessions with the [facility] where she can \"vent\" and \"feel my feelings\" as opposed to suppressing them. [facility] explained that she is going to sign up for a gym membership once she has the funds and expressed interest in reviewing the [employer]. [address] as a resource she might be able to utilize in the meantime. [facility] also agreed to try listening to a guided meditation before bed and if she wakes up in the night. Previous Recommendations:   Practice self-care daily by taking mindful showers. Follow-up with the [facility] in 2 weeks. MENTAL STATUS EXAM  Mood was depressed and sad with tearful affect. Suicidal ideation was denied. Homicidal ideation was denied. Hygiene was good . Dress was appropriate. Behavior was Within Normal Limits with No observation or self-report of difficulties ambulating. Attitude was Cooperative. Eye-contact was good. Speech: rate - WNL, rhythm - WNL, volume - soft. Verbalizations were goal directed and coherent. Thought processes were intact and goal-oriented without evidence of delusions, hallucinations, obsessions, or thony; with no cognitive distortions. Associations were characterized by intact cognitive processes. Pt was oriented to person, place, time, and general circumstances;  recent:  good. Insight and judgment were estimated to be good to fair, AEB, a fair  understanding of cyclical maladaptive patterns, and the ability to use insight to inform behavior change. Attention span and concentration appear within functional limits  Language use and knowledge base appear within functional limits        [address] [facility] presented to the appointment today for evaluation and treatment of symptoms of depression, anxiety, and stress.   She is currently deemed no risk to herself or others and meets criteria for major depressive disorder, recurrent episode, moderate and generalized anxiety disorder. She will continue to benefit from treatment focused on depression and anxiety. Rosa Melton was in agreement with recommendations. PHQ Scores 1/6/2022 9/28/2021 1/5/2021 3/9/2020 9/7/2017 9/7/2017   PHQ2 Score 4 3 4 1 0 0   PHQ9 Score 13 15 15 1 0 0     Interpretation of Total Score Depression Severity: 1-4 = Minimal depression, 5-9 = Mild depression, 10-14 = Moderate depression, 15-19 = Moderately severe depression, 20-27 = Severe depression    How often pt has had thoughts of death or hurting self (if PHQ positive for depression):       No flowsheet data found. Interpretation of MELANIE-7 score: 5-9 = mild anxiety, 10-14 = moderate anxiety, 15+ = severe anxiety. Recommend referral to behavioral health for scores 10 or greater. DIAGNOSIS  Rosa Melton was seen today for depression, stress and follow-up. Diagnoses and all orders for this visit:    Major depressive disorder, recurrent episode, moderate (HCC)    MELANIE (generalized anxiety disorder)          INTERVENTION  Provided education, Discussed self-care (sleep, nutrition, rewarding activities, social support, exercise), Discussed potential barriers to change, Established rapport, Supportive techniques, Emphasized self-care as important for managing overall health, Identified maladaptive thoughts, and Provided pt list of websites and several smartphone kalie resources for further practicing guided meditations and breathing exercises      PLAN  Pt will continue to work on sleep hygiene to improve sleep quality and duration. Pt will practice guided mindful meditation prior to sleep and/or if she wakes up in the middle of the night. Pt will follow-up with the [facility] in 2.5 weeks (08/31/2022 @ 4pm via Doxy)        INTERACTIVE COMPLEXITY  Is interactive complexity present?   No  Reason:   N/A  Additional Supporting Information:  N/A     Electronically signed by Christiano Santiago

## 2022-08-31 ENCOUNTER — TELEMEDICINE (OUTPATIENT)
Dept: BEHAVIORAL/MENTAL HEALTH CLINIC | Age: 52
End: 2022-08-31
Payer: COMMERCIAL

## 2022-08-31 DIAGNOSIS — F41.1 GAD (GENERALIZED ANXIETY DISORDER): ICD-10-CM

## 2022-08-31 DIAGNOSIS — F33.1 MAJOR DEPRESSIVE DISORDER, RECURRENT EPISODE, MODERATE (HCC): Primary | ICD-10-CM

## 2022-08-31 PROCEDURE — 90834 PSYTX W PT 45 MINUTES: CPT | Performed by: PSYCHOLOGIST

## 2022-08-31 NOTE — PROGRESS NOTES
ADULT BEHAVIORAL HEALTH FOLLOW UP  [place], [place]. Psychology Doctoral Trainee    Supervising Clinical Psychologists:  Juliet Morales, Ph.D. Elvira Curtis,  Ph.D. Jovani Arch[phone]          Visit Date: 8/31/2022   Time of appointment:  4:01-45pm   Time spent with Patient: 44 minutes. This is patient's 17th appointment. Reason for Consult:  Anxiety, Depression, and Stress     Referring Provider/PCP:    No ref. provider found  Sarah Jerome MD      Pt was seen for a virtual visit (using audio & video) due to the COVID-19 pandemic via Doxy. She provided informed consent for the behavioral health program and for using telehealth, as well as working with a trainee. Pt was alone in her home in [place], [place] during the visit. Pt provided emergency contact information and verbal consent to contact her son Trenton Adams, he/him/his) at [phone]. Clinician location: [facility] office in [place], [place]     Discussed with patient model of service to include the limits of confidentiality (i.e. abuse reporting, suicide intervention, etc.) and short-term intervention focused approach. Discussed the risks to using telehealth (I.e., service disruptions). Also discussed with patient that the service provider is a supervised clinician and is being supervised by Dr. John Seaman or Dr. Hakeem Bentley. Pt indicated understanding. Vertis Oppenheim is a 46 y.o. female who presents for follow up of anxiety, depression, and stress. Esther Vega described things as being \"alright\" recently and that although she still is stressed about her finances she has been motivated to work more and \"slowly but surely\" she says she's \"catching up\" on finances. Esther Vega also described things as being \"better\" since the last session with the [facility] on 08/08/202 and she attributed to this to receiving vitamin B12 shots and increased prayer and spiritual practices.  Since receiving the vitamin B12 shots and engaging in spiritual practices she has been more social and has reached out to support others spiritually and gone to [employer] night for the first time in several months. She also is going to join the gym with a coworker to start working out regularly again. Additionally, she agreed to go on vacation next week with her cousin for their birthdays. Previous Recommendations:   Pt will continue to work on sleep hygiene to improve sleep quality and duration. Pt will practice guided mindful meditation prior to sleep and/or if she wakes up in the middle of the night. Pt will follow-up with the [facility] in 2.5 weeks (08/31/2022 @ 4pm via Doxy)        MENTAL STATUS EXAM  Mood was euthymic with congruent affect. Suicidal ideation was denied. Homicidal ideation was denied. Hygiene was good . Dress was appropriate. Behavior was Within Normal Limits with No observation or self-report of difficulties ambulating. Attitude was Cooperative, [place], and Friendly. Eye-contact was good. Speech: rate - WNL, rhythm - WNL, volume - WNL. Verbalizations were goal directed and coherent. Thought processes were intact and goal-oriented without evidence of delusions, hallucinations, obsessions, or thony; with no cognitive distortions. Associations were characterized by intact cognitive processes. Pt was oriented to person, place, time, and general circumstances;  recent:  good and remote:  good. Insight and judgment were estimated to be good, AEB, a good  understanding of cyclical maladaptive patterns, and the ability to use insight to inform behavior change. Attention span and concentration appear within functional limits  Language use and knowledge base appear within functional limits        [address] [facility] presented to the appointment today for evaluation and treatment of symptoms of depression, anxiety, and stress.   She is currently deemed no risk to herself or others and meets criteria for major depressive disorder,

## 2022-09-21 ENCOUNTER — TELEMEDICINE (OUTPATIENT)
Dept: BEHAVIORAL/MENTAL HEALTH CLINIC | Age: 52
End: 2022-09-21
Payer: COMMERCIAL

## 2022-09-21 DIAGNOSIS — F41.1 GAD (GENERALIZED ANXIETY DISORDER): ICD-10-CM

## 2022-09-21 DIAGNOSIS — F33.1 MAJOR DEPRESSIVE DISORDER, RECURRENT EPISODE, MODERATE (HCC): Primary | ICD-10-CM

## 2022-09-21 PROCEDURE — 90832 PSYTX W PT 30 MINUTES: CPT | Performed by: PSYCHOLOGIST

## 2022-09-21 NOTE — PROGRESS NOTES
disengaging from activities like bingo, prayer, and other spiritual practices. She expressed a desire to engage in activities again because she's noticed her mood feeling more down and irritable when she doesn't engage in activities. Clayton Fitzgerald agreed to engage in behavioral activation activities like doing her nails and braiding her hair, and engaging in spiritual practices as self-care to see if this has an impact on her mood. Previous Recommendations:   Pt will review the financial counseling resources sent via text message by the [facility]. Pt will continue to engage in regular self-care activities. Pt will follow-up with the [facility] in 3 weeks virtually via Doxy on 09/21/2022 @ 4pm.       MENTAL STATUS EXAM  Mood was irritable with congruent affect. Suicidal ideation was denied. Homicidal ideation was denied. Hygiene was  unobservable due to visit being completed via phone call . Dress was unobservable due to visit being completed via phone call. Behavior was unobservable due to visit being completed via phone call with No observation or self-report of difficulties ambulating. Attitude was Cooperative and [place]. Eye-contact was unobservable due to visit being completed via phone call. Speech: rate - WNL, rhythm - WNL, volume - WNL. Verbalizations were goal directed and coherent. Thought processes were intact and goal-oriented without evidence of delusions, hallucinations, obsessions, or thony; with little cognitive distortions. Associations were characterized by intact cognitive processes. Pt was oriented to person, place, time, and general circumstances;  recent:  good. Insight and judgment were estimated to be good to fair, AEB, a good  understanding of cyclical maladaptive patterns, and the ability to use insight to inform behavior change.    Attention span and concentration appear within functional limits  Language use and knowledge base appear within functional limits        ASSESSMENT  nna Phyllis Burns presented to the appointment today for evaluation and treatment of symptoms of depression, anxiety, and stress. She is currently deemed no risk to herself or others and meets criteria for major depressive disorder, recurrent episode, moderate and generalized anxiety disorder. She will continue to benefit from treatment focused on depression and anxiety. Mary Acharya was in agreement with recommendations. PHQ Scores 1/6/2022 9/28/2021 1/5/2021 3/9/2020 9/7/2017 9/7/2017   PHQ2 Score 4 3 4 1 0 0   PHQ9 Score 13 15 15 1 0 0     Interpretation of Total Score Depression Severity: 1-4 = Minimal depression, 5-9 = Mild depression, 10-14 = Moderate depression, 15-19 = Moderately severe depression, 20-27 = Severe depression    How often pt has had thoughts of death or hurting self (if PHQ positive for depression):       No flowsheet data found. Interpretation of MELANIE-7 score: 5-9 = mild anxiety, 10-14 = moderate anxiety, 15+ = severe anxiety. Recommend referral to behavioral health for scores 10 or greater. DIAGNOSIS  Mary Acharya was seen today for anxiety, depression and stress. Diagnoses and all orders for this visit:    Major depressive disorder, recurrent episode, moderate (HCC)    MELANIE (generalized anxiety disorder)        INTERVENTION  Discussed prevalence of  depression and stress for general population, Discussed various factors related to the development and maintenance of  depression and stress (including biological, cognitive, behavioral, and environmental factors), Trained in strategies for increasing balanced thinking, Discussed and set plan for behavioral activation, Discussed self-care (sleep, nutrition, rewarding activities, social support, exercise), Supportive techniques, and Emphasized self-care as important for managing overall health      PLAN  Pt will engage in behavioral activation practices of doing her nails and hair. Pt will engage in spiritual practices as self-care.    Pt will send [facility] paperwork for renewal, due on 10/12/2022. Pt will follow-up via Doxy with the [facility] in 2 weeks on 10/05/2022 @ 4pm.       INTERACTIVE COMPLEXITY  Is interactive complexity present?   No  Reason:   N/A  Additional Supporting Information:  N/A     Electronically signed by Frantz Haro on 9/21/22 at 2:34 PM EDT

## 2022-10-05 ENCOUNTER — TELEPHONE (OUTPATIENT)
Dept: BEHAVIORAL/MENTAL HEALTH CLINIC | Age: 52
End: 2022-10-05

## 2022-10-05 ENCOUNTER — TELEMEDICINE (OUTPATIENT)
Dept: BEHAVIORAL/MENTAL HEALTH CLINIC | Age: 52
End: 2022-10-05
Payer: COMMERCIAL

## 2022-10-05 DIAGNOSIS — F33.1 MAJOR DEPRESSIVE DISORDER, RECURRENT EPISODE, MODERATE (HCC): Primary | ICD-10-CM

## 2022-10-05 DIAGNOSIS — F41.1 GAD (GENERALIZED ANXIETY DISORDER): ICD-10-CM

## 2022-10-05 PROCEDURE — 90832 PSYTX W PT 30 MINUTES: CPT | Performed by: PSYCHOLOGIST

## 2022-10-05 NOTE — PROGRESS NOTES
ADULT BEHAVIORAL HEALTH FOLLOW UP  [place], [place]. Psychology Doctoral Trainee    Supervising Clinical Psychologists:  Arcadio Whiteside, Ph.D. Shannon Viera,  Ph.D. Manjit Melvin [phone]          Visit Date: 10/5/2022   Time of appointment:  4-4:30pm   Time spent with Patient: 30 minutes. This is patient's  19th  appointment. Reason for Consult:  Anxiety, Depression, and Stress     Referring Provider/PCP:    No ref. provider found  Justice Otero MD      Pt was seen for a virtual visit (using audio & video) due to the COVID-19 pandemic via Doxy. She provided informed consent for the behavioral health program and for using telehealth, as well as working with a trainee. Pt was alone in her car in [place], [place] during the visit. Pt provided emergency contact information and verbal consent to contact her son Jhonathan Machado, he/him/his) at [phone]. Clinician location: [facility] office in [place], [place]      Discussed with patient model of service to include the limits of confidentiality (i.e. abuse reporting, suicide intervention, etc.) and short-term intervention focused approach. Discussed the risks to using telehealth (I.e., service disruptions). Also discussed with patient that the service provider is a supervised clinician and is being supervised by Dr. Lorenzo Yadav or Dr. Aleida Martinez. Pt indicated understanding. Blas Armenta is a 46 y.o. female who presents for follow up of anxiety, depression, and stress. Gabriela Garnica explained that things have been \"the same\" in terms of stress and that she has been \"keeping busy\" to distract herself. She reported that she has felt more anxious lately, though keeps trying to add more things at work or with her niece to keep her \"mind off of it\". Gabriela Garnica explained that her [facility] paperwork will need renewed and agreed to fax the paperwork to the [address] [place] [address] for the [facility] and supervisor to complete by 10/12/2022.  Gabriela Garnica also described continued difficulties sleeping and that she is \"getting used\" to the level of fatigue and expressed a desire to work on this in therapy. The [facility] agreed to send Radha Arango information about a psychotherapy treatment that focuses on sleep and review its specifics with her at the next session. At the end of session, Radha Arango explained that she is still facing financial difficulties and requested to be referred for social work services through [facility]) by the [facility]. Previous Recommendations:   Pt will engage in behavioral activation practices of doing her nails and hair. Pt will engage in spiritual practices as self-care. Pt will send [facility] paperwork for renewal, due on 10/12/2022. Pt will follow-up via Doxy with the [facility] in 2 weeks on 10/05/2022 @ 4pm.       MENTAL STATUS EXAM  Mood was irritable with unobservable affect due to visit being completed via phone call. Suicidal ideation was denied. Homicidal ideation was denied. Hygiene was  unobservable due to visit being completed via phone call  . Dress was unobservable due to visit being completed via phone call . Behavior was unobservable due to visit being completed via phone call  with No self-report of difficulties ambulating. Attitude was Cooperative and Help-seeking. Eye-contact was unobservable due to visit being completed via phone call. Speech: rate - slowed, rhythm - WNL, volume - soft. Verbalizations were goal directed and coherent. Thought processes were intact and goal-oriented without evidence of delusions, hallucinations, obsessions, or thony; with little cognitive distortions. Associations were characterized by intact cognitive processes. Pt was oriented to person, place, time, and general circumstances;  recent:  good. Insight and judgment were estimated to be good to fair, AEB, a fair  understanding of cyclical maladaptive patterns, and the ability to use insight to inform behavior change.    Attention span and concentration appear within functional limits  Language use and knowledge base appear within functional limits        [address] presented to the appointment today for evaluation and treatment of symptoms of depression, anxiety, and stress. She is currently deemed no risk to herself or others and meets criteria for major depressive disorder, recurrent episode, moderate and generalized anxiety disorder. She will continue to benefit from treatment focused on depression and anxiety. Tiffany Giordano was in agreement with recommendations. PHQ Scores 1/6/2022 9/28/2021 1/5/2021 3/9/2020 9/7/2017 9/7/2017   PHQ2 Score 4 3 4 1 0 0   PHQ9 Score 13 15 15 1 0 0     Interpretation of Total Score Depression Severity: 1-4 = Minimal depression, 5-9 = Mild depression, 10-14 = Moderate depression, 15-19 = Moderately severe depression, 20-27 = Severe depression    How often pt has had thoughts of death or hurting self (if PHQ positive for depression):       No flowsheet data found. Interpretation of MELANIE-7 score: 5-9 = mild anxiety, 10-14 = moderate anxiety, 15+ = severe anxiety. Recommend referral to behavioral health for scores 10 or greater. DIAGNOSIS  Tiffany Giordano was seen today for anxiety, depression and stress.     Diagnoses and all orders for this visit:    Major depressive disorder, recurrent episode, moderate (HCC)    MELANIE (generalized anxiety disorder)        INTERVENTION  Discussed various factors related to the development and maintenance of  anxiety, stress, and insomnia (including biological, cognitive, behavioral, and environmental factors), Discussed potential treatments for  insomnia, Discussed self-care (sleep, nutrition, rewarding activities, social support, exercise), Christine-setting to identify pt's primary goals for [facility] visit / overall health, Supportive techniques, Emphasized self-care as important for managing overall health, Provided Psychoeducation re: insomnia, and Emphasized importance of regular practice of relaxation strategies to target / promote stress & anxiety reduction      PLAN  Pt will engage in self-care by going to the gym after session. Pt will continue to engage in spiritual practices as self-care. Pt will send [place] for renewal, due on 10/12/2022. [facility] will provide a referral for CHW services. Pt will follow-up via telephone or Doxy with the [facility] in 3 weeks on 10/26/2022 @ 4pm.       INTERACTIVE COMPLEXITY  Is interactive complexity present?   No  Reason:   N/A  Additional Supporting Information:  N/A     Electronically signed by Ciarra Medal on 10/5/22 at 4:00 PM EDT

## 2022-10-05 NOTE — TELEPHONE ENCOUNTER
[facility] called JELANI to share fax number to send her [facility] paperwork to on 10/05/2022 @ 9:05am.

## 2022-11-14 ENCOUNTER — TELEPHONE (OUTPATIENT)
Dept: BEHAVIORAL/MENTAL HEALTH CLINIC | Age: 52
End: 2022-11-14

## 2022-11-14 NOTE — TELEPHONE ENCOUNTER
[facility] called pt on 11/14/2022 @ 3pm to complete scheduled session and pt requested to reschedule due to death in the family. [facility] agreed to reschedule at first available slot.

## 2022-12-01 ENCOUNTER — TELEPHONE (OUTPATIENT)
Dept: INTERNAL MEDICINE | Age: 52
End: 2022-12-01

## 2022-12-01 DIAGNOSIS — U07.1 COVID-19: Primary | ICD-10-CM

## 2022-12-01 NOTE — TELEPHONE ENCOUNTER
Pt reports sinus congestion, body ache, headache. Had a negative covid test at home, pt is requesting PCR covid test. Order is pended, please review and sign or advise, thanks!

## 2022-12-05 NOTE — TELEPHONE ENCOUNTER
PC to pt to let her know order active and that she can go to [facility] lab to get swab done.

## 2023-02-07 ENCOUNTER — OFFICE VISIT (OUTPATIENT)
Dept: INTERNAL MEDICINE | Age: 53
End: 2023-02-07
Payer: COMMERCIAL

## 2023-02-07 VITALS
SYSTOLIC BLOOD PRESSURE: 140 MMHG | WEIGHT: 189 LBS | DIASTOLIC BLOOD PRESSURE: 82 MMHG | HEART RATE: 83 BPM | HEIGHT: 65 IN | OXYGEN SATURATION: 100 % | TEMPERATURE: 97.9 F | BODY MASS INDEX: 31.49 KG/M2

## 2023-02-07 DIAGNOSIS — Z12.31 ENCOUNTER FOR SCREENING MAMMOGRAM FOR MALIGNANT NEOPLASM OF BREAST: ICD-10-CM

## 2023-02-07 DIAGNOSIS — Z12.11 COLON CANCER SCREENING: ICD-10-CM

## 2023-02-07 DIAGNOSIS — F33.1 MAJOR DEPRESSIVE DISORDER, RECURRENT EPISODE, MODERATE (HCC): ICD-10-CM

## 2023-02-07 DIAGNOSIS — F41.1 GAD (GENERALIZED ANXIETY DISORDER): ICD-10-CM

## 2023-02-07 DIAGNOSIS — E66.9 OBESITY (BMI 30-39.9): ICD-10-CM

## 2023-02-07 DIAGNOSIS — I10 ESSENTIAL HYPERTENSION: Primary | ICD-10-CM

## 2023-02-07 PROCEDURE — 99213 OFFICE O/P EST LOW 20 MIN: CPT

## 2023-02-07 PROCEDURE — 99211 OFF/OP EST MAY X REQ PHY/QHP: CPT

## 2023-02-07 PROCEDURE — 3079F DIAST BP 80-89 MM HG: CPT

## 2023-02-07 PROCEDURE — 3077F SYST BP >= 140 MM HG: CPT

## 2023-02-07 RX ORDER — SERTRALINE HYDROCHLORIDE 25 MG/1
25 TABLET, FILM COATED ORAL DAILY
Qty: 30 TABLET | Refills: 3 | Status: SHIPPED | OUTPATIENT
Start: 2023-02-07

## 2023-02-07 RX ORDER — HYDROCHLOROTHIAZIDE 25 MG/1
25 TABLET ORAL EVERY MORNING
Qty: 90 TABLET | Refills: 1 | Status: SHIPPED | OUTPATIENT
Start: 2023-02-07

## 2023-02-07 RX ORDER — PHENTERMINE HYDROCHLORIDE 37.5 MG/1
37.5 CAPSULE ORAL EVERY MORNING
COMMUNITY

## 2023-02-07 ASSESSMENT — PATIENT HEALTH QUESTIONNAIRE - PHQ9
SUM OF ALL RESPONSES TO PHQ9 QUESTIONS 1 & 2: 2
5. POOR APPETITE OR OVEREATING: 0
8. MOVING OR SPEAKING SO SLOWLY THAT OTHER PEOPLE COULD HAVE NOTICED. OR THE OPPOSITE, BEING SO FIGETY OR RESTLESS THAT YOU HAVE BEEN MOVING AROUND A LOT MORE THAN USUAL: 1
9. THOUGHTS THAT YOU WOULD BE BETTER OFF DEAD, OR OF HURTING YOURSELF: 0
4. FEELING TIRED OR HAVING LITTLE ENERGY: 1
3. TROUBLE FALLING OR STAYING ASLEEP: 3
SUM OF ALL RESPONSES TO PHQ QUESTIONS 1-9: 9
10. IF YOU CHECKED OFF ANY PROBLEMS, HOW DIFFICULT HAVE THESE PROBLEMS MADE IT FOR YOU TO DO YOUR WORK, TAKE CARE OF THINGS AT HOME, OR GET ALONG WITH OTHER PEOPLE: 0
SUM OF ALL RESPONSES TO PHQ QUESTIONS 1-9: 9
SUM OF ALL RESPONSES TO PHQ QUESTIONS 1-9: 9
1. LITTLE INTEREST OR PLEASURE IN DOING THINGS: 1
2. FEELING DOWN, DEPRESSED OR HOPELESS: 1
7. TROUBLE CONCENTRATING ON THINGS, SUCH AS READING THE NEWSPAPER OR WATCHING TELEVISION: 1
SUM OF ALL RESPONSES TO PHQ QUESTIONS 1-9: 9
6. FEELING BAD ABOUT YOURSELF - OR THAT YOU ARE A FAILURE OR HAVE LET YOURSELF OR YOUR FAMILY DOWN: 1

## 2023-02-07 ASSESSMENT — ANXIETY QUESTIONNAIRES
7. FEELING AFRAID AS IF SOMETHING AWFUL MIGHT HAPPEN: 1
5. BEING SO RESTLESS THAT IT IS HARD TO SIT STILL: 1
2. NOT BEING ABLE TO STOP OR CONTROL WORRYING: 1
1. FEELING NERVOUS, ANXIOUS, OR ON EDGE: 1
6. BECOMING EASILY ANNOYED OR IRRITABLE: 1
3. WORRYING TOO MUCH ABOUT DIFFERENT THINGS: 1
GAD7 TOTAL SCORE: 7
4. TROUBLE RELAXING: 1

## 2023-02-07 ASSESSMENT — ENCOUNTER SYMPTOMS
NAUSEA: 0
BACK PAIN: 0
VOMITING: 0
COUGH: 0
SHORTNESS OF BREATH: 0
WHEEZING: 0
ABDOMINAL PAIN: 0
CONSTIPATION: 0
DIARRHEA: 0

## 2023-02-07 NOTE — PROGRESS NOTES
[facility] [address] [address]  Dept: [phone]  Dept Fax: [phone]    Office Progress/Follow Up Note  Date ofpatient's visit: 2/7/2023  Patient's Name:  Lashell Marie YOB: 1970            Patient Care Team:  Edy Balderrama MD as PCP - Shiva Lozoya MD as PCP - Lenora Provider  ================================================================    REASON FOR VISIT/CHIEF COMPLAINT:  Hypertension and Health Maintenance (Depression screening score is 10)    HISTORY OF PRESENTING ILLNESS:  History was obtained from: patient, electronic medical record. Corrie Galvez a 46 y.o. is here for a regular physical exam visit and reports that she came to get her blood pressure checked    Patient was last seen in our office in September 2021. Patient has history of generalized anxiety disorder and major depressive disorder. Current medications include adipex and zyrtec    During that visit patient's blood pressure was high, patient was recommended to check her blood pressure regularly for 2 weeks and return to the office which she did not. Patient reports that she has been checking her blood pressure approximately 2 times a week and the highest numbers she have seen are systolics in the 716V and diastolics in the 05G, lowest she has seen our systolics in the 901G and diastolics in 99D. Risk factors include obesity, generalized anxiety disorder. Denies any chest pain headaches, denies any fevers or chills recently, denies any palpitations, denies any nausea vomiting or diarrhea recently    Patient was seeing Dr. Eve Méndez for weight loss, last saw in April 2022, she stopped seeing them, seeing someone in [place] takes Adipex for weight loss and calorie controlled diet and exercise.     Patient follows with [facility] psychiatry for depression and anxiety for last saw them in the office in Jan 2022 since then she had multiple virtual visits, last virtual visit in October 2022  PHQ9 score 9 today, MELANIE-7 score 7  Today patient reports that she still feels anxious and her depression score was 9 today. Patient was asking medication to help her calm down. States that she is seeing the psychologist but they cannot prescribe any medication. Last BMP in September 2021 did not show any abnormalities  UA with microscopy negative for protein, negative for UTI, negative for casts RBC  Last HbA1c in October 2020 was 5.6  Last lipid panel in 2020 normal    Smoking - never  Alcohol - 3-4 drinks per week  Denies any use of drinks    Due for breast cancer screening, okay mammogram  Due for colon cancer screening, okay for colonoscopy, high risk patient, family history of colon CA in aunt mom s sister  Last Pap smear with HPV was in 2015, due for cervical cancer screening, okay pap test      Patient Active Problem List   Diagnosis    Lipoma of shoulder    Obesity (BMI 30-39. 9)    Major depressive disorder, recurrent episode, moderate (HCC)    MELANIE (generalized anxiety disorder)    Essential hypertension       Health Maintenance Due   Topic Date Due    Colorectal Cancer Screen  Never done    Cervical cancer screen  07/22/2018    Breast cancer screen  12/14/2022    Depression Monitoring  01/06/2023       Allergies   Allergen Reactions    Seasonal          Current Outpatient Medications   Medication Sig Dispense Refill    phentermine (ADIPEX-P) 37.5 MG capsule Take 37.5 mg by mouth every morning. hydroCHLOROthiazide (HYDRODIURIL) 25 MG tablet Take 1 tablet by mouth every morning 90 tablet 1    sertraline (ZOLOFT) 25 MG tablet Take 1 tablet by mouth daily 30 tablet 3    cetirizine (ZYRTEC) 10 MG tablet Take 1 tablet by mouth daily 30 tablet 1     No current facility-administered medications for this visit.        Social History     Tobacco Use    Smoking status: Never    Smokeless tobacco: Never   Vaping Use    Vaping Use: Never used   Substance Use Topics    Alcohol use: Yes     Alcohol/week: 2.0 standard drinks     Types: 2 Shots of liquor per week     Comment: occassional    Drug use: No       Family History   Problem Relation Age of Onset    Cancer Maternal Aunt 52        colon    Cancer Maternal Grandmother 54        breast    Diabetes Mother     Breast Cancer Neg Hx     Colon Cancer Neg Hx     Eclampsia Neg Hx     Hypertension Neg Hx     Ovarian Cancer Neg Hx      Labor Neg Hx     Spont Abortions Neg Hx     Stroke Neg Hx         REVIEW OF SYSTEMS:  Review of Systems   Constitutional:  Negative for activity change, appetite change, chills, fatigue and fever. Respiratory:  Negative for cough, shortness of breath and wheezing. Cardiovascular:  Negative for chest pain, palpitations and leg swelling. Gastrointestinal:  Negative for abdominal pain, constipation, diarrhea, nausea and vomiting. Genitourinary:  Negative for difficulty urinating, dysuria and flank pain. Musculoskeletal:  Negative for arthralgias and back pain. Neurological:  Positive for weakness. Negative for dizziness, syncope, light-headedness and headaches. Psychiatric/Behavioral:  Positive for decreased concentration and sleep disturbance. Negative for suicidal ideas. The patient is nervous/anxious. PHYSICAL EXAM:  Vitals:    23 0837 23 0842   BP: (!) 141/85 (!) 140/82   Site: Left Upper Arm Left Upper Arm   Position: Sitting Sitting   Cuff Size: Medium Adult Medium Adult   Pulse: 83    Temp: 97.9 °F (36.6 °C)    SpO2: 100%    Weight: 189 lb (85.7 kg)    Height: 5' 5\" (1.651 m)      BP Readings from Last 3 Encounters:   23 (!) 140/82   22 139/87   22 (!) 143/84        Physical Exam  Constitutional:       Appearance: Normal appearance. Cardiovascular:      Heart sounds: Normal heart sounds. Pulmonary:      Breath sounds: Normal breath sounds. Abdominal:      General: There is no distension. Palpations: Abdomen is soft. Tenderness: There is no abdominal tenderness. Musculoskeletal:         General: No swelling. Normal range of motion. Right lower leg: No edema. Left lower leg: No edema. Neurological:      General: No focal deficit present. Mental Status: She is alert and oriented to person, place, and time. Psychiatric:      Comments: Depressed and anxious         DIAGNOSTIC FINDINGS:  CBC:  Lab Results   Component Value Date/Time    WBC 7.1 09/07/2017 01:41 PM    HGB 12.9 09/07/2017 01:41 PM     09/07/2017 01:41 PM       BMP:    Lab Results   Component Value Date/Time     09/28/2021 10:01 AM    K 4.3 09/28/2021 10:01 AM     09/28/2021 10:01 AM    CO2 23 09/28/2021 10:01 AM    BUN 14 09/28/2021 10:01 AM    CREATININE 0.62 09/28/2021 10:01 AM    GLUCOSE 89 09/28/2021 10:01 AM       HEMOGLOBIN A1C:   Lab Results   Component Value Date/Time    LABA1C 5.6 10/13/2020 10:39 AM       FASTING LIPID PANEL:  Lab Results   Component Value Date    CHOL 172 07/24/2020    HDL 66 07/24/2020    TRIG 44 07/24/2020       ASSESSMENT AND PLAN:  Clinton Irvin was seen today for hypertension and health maintenance. Diagnoses and all orders for this visit:    Essential hypertension - new diagnosis  -     hydroCHLOROthiazide (HYDRODIURIL) 25 MG tablet; Take 1 tablet by mouth every morning  -     CBC with Auto Differential; Future  -     Basic Metabolic Panel; Future  -     Urinalysis with Microscopic; Future  -     TSH With Reflex Ft4; Future    MELANIE (generalized anxiety disorder)        -     MELANIE 7 score is 7 today  -     sertraline (ZOLOFT) 25 MG tablet;  Take 1 tablet by mouth daily    Major depressive disorder, recurrent episode, moderate (HCC)        -     PHQ 9 score is 9 today        -     Resume Zoloft and continue behavioral health    Obesity (BMI 30-39.9)-       -      Counseled on low carbohydrate diet and exercise    Encounter for screening mammogram for malignant neoplasm of breast  -     YAMILE DIGITAL SCREEN W OR WO CAD BILATERAL; Future    Colon cancer screening  -     [facility] Screening Colonoscopy        FOLLOW UP AND INSTRUCTIONS:  Return in about 6 weeks (around 3/21/2023) for pap, depression and hypertension follow up. Yamilex Jenkins received counseling on the following healthy behaviors: nutrition, exercise, and decrease in alcohol consumption    Discussed use, benefit, and side effects of prescribed medications. Barriers to medication compliance addressed. All patient questions answered. Pt voiced understanding. Patient given educational materials - see patient instructions    Suhas Chappell MD  Internal Medicine Resident, [facility]; [place], [place]  2/7/2023, 11:35 AM      This note is created with the assistance of a speech-recognition program. While intending to generate a document that actually reflects the content of thevisit, the document can still have some mistakes which may not have been identified and corrected by editing.

## 2023-02-07 NOTE — PROGRESS NOTES
Attending Physician Statement  I have discussed the care of Warren Yañez, including pertinent history and exam findings with the resident. I have reviewed the key elements of all parts of the encounter with the resident. The plan and orders should include   Orders Placed This Encounter   Procedures    YAMILE DIGITAL SCREEN W OR WO CAD BILATERAL    CBC with Auto Differential    Basic Metabolic Panel    Urinalysis with Microscopic    TSH With Reflex Ft4    [facility] Screening Colonoscopy    and this was also documented by the resident. The medication list was reviewed with the resident and is up to date. The return visit should be in 6 weeks . Diagnosis Orders   1. Essential hypertension  hydroCHLOROthiazide (HYDRODIURIL) 25 MG tablet    CBC with Auto Differential    Basic Metabolic Panel    Urinalysis with Microscopic    TSH With Reflex Ft4      2. MELANIE (generalized anxiety disorder)  sertraline (ZOLOFT) 25 MG tablet      3. Major depressive disorder, recurrent episode, moderate (HCC)        4. Obesity (BMI 30-39.9)        5. Encounter for screening mammogram for malignant neoplasm of breast  YAMILE DIGITAL SCREEN W OR WO CAD BILATERAL      6.  Colon cancer screening  [facility] Screening Colonoscopy           Kannan Smalls MD   Attending Physician, [facility]   Faculty, Internal Medicine Residency Program  [address] [place] [address]

## 2023-02-08 ENCOUNTER — TELEMEDICINE (OUTPATIENT)
Dept: BEHAVIORAL/MENTAL HEALTH CLINIC | Age: 53
End: 2023-02-08
Payer: COMMERCIAL

## 2023-02-08 DIAGNOSIS — F33.1 MAJOR DEPRESSIVE DISORDER, RECURRENT EPISODE, MODERATE (HCC): Primary | ICD-10-CM

## 2023-02-08 DIAGNOSIS — F41.1 GAD (GENERALIZED ANXIETY DISORDER): ICD-10-CM

## 2023-02-08 PROCEDURE — 90832 PSYTX W PT 30 MINUTES: CPT | Performed by: PSYCHOLOGIST

## 2023-02-08 NOTE — PROGRESS NOTES
ADULT BEHAVIORAL HEALTH FOLLOW UP  KENNY Can.  Psychology Doctoral Trainee    Supervising Clinical Psychologists:  Tamar Arriola, Ph.D. [place] License 7427  Lexy Benjamin,  Ph.D. [place] License 7464      Visit Date: 2/8/2023   Time of appointment:  4-4:30pm   Time spent with Patient: 30 minutes.   This is patient's  20th  appointment.    Reason for Consult:  Anxiety, Depression, and Stress     Referring Provider/PCP:    No ref. provider found  Matheus Hand MD      Pt provided verbal consent to engage in telehealth psychotherapy due to contact restrictions related to the COVID-19 pandemic.  The visit was completed virtually through telephone call (audio only), as pt was unable to access Doxy for a video call. She provided informed consent for the behavioral health program and for using telehealth, as well as working with a trainee.     Session was held in a private space (pt’s parked car in [place]) and she reported that she was alone.  Some parts of the mental status exam were not observable as the visit was audio only and the pt could not be visually assessed. Pt provided her son (Ulices, he/him/his) as an emergency  who can be reached at [phone].      Discussed with patient model of service to include the limits of confidentiality (i.e. abuse reporting, suicide intervention, etc.) and short-term intervention focused approach. Discussed the risks to using telehealth (I.e., service disruptions). Also discussed with patient that the service provider is a supervised clinician and is being supervised by Dr. Benjamin or Dr. Arriola. Pt indicated understanding.      [facility]/Clinician Location: [facility]; [place], [place]     ASHLEY Powers is a 52 y.o. female who presents for follow up of anxiety, depression, and stress. Priya reported that she is \"feeling really good\" about herself due to maintaining her 50lb weight loss and \"finally\" taking care of herself by engaging in regular  self-care. She explained that she attended an appointment to get re-established with regular PCP visits and was prescribed Zoloft which she expressed some anxiety around taking. However, Nemesio Mckinney explained that she is willing to try it and will take it in the mornings with her other prescription, Adipex. She reported that she has been getting her hair and nails done, as well as staying busy which she explained have improved her mood. Nemesio Mckinney disclosed that she still feels angry and frustrated, resulting in outbursts at times. However, she elaborated that she recognizes that she reacted later and will respond by reaching out and apologizing. Nemesio Mckinney expressed motivation to continue taking care of herself to keep feeling better, and discussed how although she might not feel \"excellent, or even good\" every day that she can feel that way again. She also expressed excitement with her brother's wedding coming up, and described having a goal to lose 9lbs that she gained over the holidays. She also reported at the end of session that being able to talk and problem-solve with the [facility] without fear of judgement greatly helps to manage her mental health symptoms. Previous Recommendations:   Pt will engage in self-care by going to the gym after session. Pt will continue to engage in spiritual practices as self-care. Pt will send LA for renewal, due on 10/12/2022. [facility] will provide a referral for CHW services. Pt will follow-up via telephone or Doxy with the [facility] in 3 weeks on 10/26/2022 @ 4pm.       MENTAL STATUS EXAM  Mood was euthymic with unobservable affect due to visit being completed via phone call. Suicidal ideation was denied. Homicidal ideation was denied. Hygiene was  unobservable due to visit being completed via phone call  . Dress was unobservable due to visit being completed via phone call .    Behavior was unobservable due to visit being completed via phone call  with No self-report of difficulties ambulating. Attitude was Friendly, Cooperative, and Help-seeking. Eye-contact was unobservable due to visit being completed via phone call. Speech: rate - WNL, rhythm - WNL, volume - WNL. Verbalizations were goal directed and coherent. Thought processes were intact and goal-oriented without evidence of delusions, hallucinations, obsessions, or thony; with no cognitive distortions. Associations were characterized by intact cognitive processes. Pt was oriented to person, place, time, and general circumstances;  recent:  good. Insight and judgment were estimated to be good, AEB, a good  understanding of cyclical maladaptive patterns, and the ability to use insight to inform behavior change. Attention span and concentration appear within functional limits  Language use and knowledge base appear within functional limits    [address] presented to the appointment today for evaluation and treatment of symptoms of depression, anxiety, and stress. She is currently deemed no risk to herself or others and meets criteria for major depressive disorder, recurrent episode, moderate and generalized anxiety disorder. She will continue to benefit from treatment focused on depression and anxiety. Cele Lucas was in agreement with recommendations. PHQ Scores 2/7/2023 1/6/2022 9/28/2021 1/5/2021 3/9/2020 9/7/2017 9/7/2017   PHQ2 Score 2 4 3 4 1 0 0   PHQ9 Score 9 13 15 15 1 0 0     Interpretation of Total Score Depression Severity: 1-4 = Minimal depression, 5-9 = Mild depression, 10-14 = Moderate depression, 15-19 = Moderately severe depression, 20-27 = Severe depression    How often pt has had thoughts of death or hurting self (if PHQ positive for depression):       MELANIE 7 SCORE 2/7/2023   MELANIE-7 Total Score 7     Interpretation of MELANIE-7 score: 5-9 = mild anxiety, 10-14 = moderate anxiety, 15+ = severe anxiety. Recommend referral to behavioral health for scores 10 or greater.       DIAGNOSIS  Cele Lucas was seen today for anxiety, depression and stress. Diagnoses and all orders for this visit:    Major depressive disorder, recurrent episode, moderate (HCC)    MELANIE (generalized anxiety disorder)        INTERVENTION  Provided education on the use of medication to treat  depression and anxiety, Trained in strategies for increasing balanced thinking, Discussed self-care (sleep, nutrition, rewarding activities, social support, exercise), Motivational Interviewing to increase patient confidence and compliance with adhering to behavioral change plan, Supportive techniques, Emphasized self-care as important for managing overall health, and Emphasized importance of regular practice of relaxation strategies to target / promote stress & anxiety reduction      PLAN  Pt will continue to engage in self-care (e.g., getting nails done, working out, spiritual practices, etc.) regularly. Pt will continue to set boundaries with friends and family when needed. Pt will continue to engage in medication adherence for managing mental health symptoms and weight loss. Pt will follow-up via telephone or Doxy with the [facility] in 3 weeks on 03/01/2023 @ 3pm.       INTERACTIVE COMPLEXITY  Is interactive complexity present?   No  Reason:   N/A  Additional Supporting Information:  N/A     Electronically signed by Yakov Tucker on 10/5/22 at 4:00 PM EDT

## 2023-03-01 ENCOUNTER — TELEPHONE (OUTPATIENT)
Dept: BEHAVIORAL/MENTAL HEALTH CLINIC | Age: 53
End: 2023-03-01

## 2023-03-01 NOTE — TELEPHONE ENCOUNTER
[address] attempted to call pt on 03/01/223 @ 3:06pm to see if she is able to attend her session scheduled @ 3pm via phone or Doxy link. Unable to complete call.

## 2023-03-15 ENCOUNTER — TELEPHONE (OUTPATIENT)
Dept: INTERNAL MEDICINE | Age: 53
End: 2023-03-15

## 2023-03-16 ENCOUNTER — TELEPHONE (OUTPATIENT)
Dept: GASTROENTEROLOGY | Age: 53
End: 2023-03-16

## 2023-03-16 NOTE — TELEPHONE ENCOUNTER
3/16/23- [facility] (1st attempt) to schedule colonoscopy. GI letter mailed to patient.

## 2023-03-22 ENCOUNTER — TELEMEDICINE (OUTPATIENT)
Dept: BEHAVIORAL/MENTAL HEALTH CLINIC | Age: 53
End: 2023-03-22
Payer: COMMERCIAL

## 2023-03-22 ENCOUNTER — TELEPHONE (OUTPATIENT)
Dept: GASTROENTEROLOGY | Age: 53
End: 2023-03-22

## 2023-03-22 DIAGNOSIS — F41.1 GENERALIZED ANXIETY DISORDER: ICD-10-CM

## 2023-03-22 DIAGNOSIS — F33.1 MAJOR DEPRESSIVE DISORDER, RECURRENT EPISODE, MODERATE (HCC): Primary | ICD-10-CM

## 2023-03-22 PROCEDURE — 90832 PSYTX W PT 30 MINUTES: CPT | Performed by: PSYCHOLOGIST

## 2023-03-22 RX ORDER — POLYETHYLENE GLYCOL 3350 17 G/17G
POWDER, FOR SOLUTION ORAL
Qty: 238 G | Refills: 0 | Status: SHIPPED | OUTPATIENT
Start: 2023-03-22

## 2023-03-22 RX ORDER — BISACODYL 5 MG/1
TABLET, DELAYED RELEASE ORAL
Qty: 4 TABLET | Refills: 0 | Status: SHIPPED | OUTPATIENT
Start: 2023-03-22

## 2023-03-22 ASSESSMENT — PATIENT HEALTH QUESTIONNAIRE - PHQ9
SUM OF ALL RESPONSES TO PHQ9 QUESTIONS 1 & 2: 3
1. LITTLE INTEREST OR PLEASURE IN DOING THINGS: 1
SUM OF ALL RESPONSES TO PHQ QUESTIONS 1-9: 12
6. FEELING BAD ABOUT YOURSELF - OR THAT YOU ARE A FAILURE OR HAVE LET YOURSELF OR YOUR FAMILY DOWN: 1
8. MOVING OR SPEAKING SO SLOWLY THAT OTHER PEOPLE COULD HAVE NOTICED. OR THE OPPOSITE, BEING SO FIGETY OR RESTLESS THAT YOU HAVE BEEN MOVING AROUND A LOT MORE THAN USUAL: 2
3. TROUBLE FALLING OR STAYING ASLEEP: 3
SUM OF ALL RESPONSES TO PHQ QUESTIONS 1-9: 12
4. FEELING TIRED OR HAVING LITTLE ENERGY: 2
SUM OF ALL RESPONSES TO PHQ QUESTIONS 1-9: 12
7. TROUBLE CONCENTRATING ON THINGS, SUCH AS READING THE NEWSPAPER OR WATCHING TELEVISION: 0
9. THOUGHTS THAT YOU WOULD BE BETTER OFF DEAD, OR OF HURTING YOURSELF: 0
2. FEELING DOWN, DEPRESSED OR HOPELESS: 2
SUM OF ALL RESPONSES TO PHQ QUESTIONS 1-9: 12
5. POOR APPETITE OR OVEREATING: 1

## 2023-03-22 NOTE — PROGRESS NOTES
patterns, and the ability to use insight to inform behavior change. Attention span and concentration appear within functional limits  Language use and knowledge base appear within functional limits    [address] presented to the appointment today for evaluation and treatment of symptoms of depression, anxiety, and stress. She is currently deemed no risk to herself or others and meets criteria for major depressive disorder, recurrent episode, moderate and generalized anxiety disorder. She will continue to benefit from treatment focused on depression and anxiety. Adrián Barnard was in agreement with recommendations. PHQ Scores 3/22/2023 2/7/2023 1/6/2022 9/28/2021 1/5/2021 3/9/2020 9/7/2017   PHQ2 Score 3 2 4 3 4 1 0   PHQ9 Score 12 9 13 15 15 1 0     Interpretation of Total Score Depression Severity: 1-4 = Minimal depression, 5-9 = Mild depression, 10-14 = Moderate depression, 15-19 = Moderately severe depression, 20-27 = Severe depression    How often pt has had thoughts of death or hurting self (if PHQ positive for depression):  Not at all    MELANIE 7 SCORE 2/7/2023   MELANIE-7 Total Score 7     Interpretation of MELANIE-7 score: 5-9 = mild anxiety, 10-14 = moderate anxiety, 15+ = severe anxiety. Recommend referral to behavioral health for scores 10 or greater. DIAGNOSIS  Adrián Barnard was seen today for anxiety, depression and stress.     Diagnoses and all orders for this visit:    Major depressive disorder, recurrent episode, moderate (HCC)    Generalized anxiety disorder      INTERVENTION  Provided education on the use of medication to treat  depression, Discussed various factors related to the development and maintenance of  depression and anxiety (including biological, cognitive, behavioral, and environmental factors), Discussed self-care (sleep, nutrition, rewarding activities, social support, exercise), Supportive techniques, Emphasized self-care as important for managing overall health, and Problem-solving

## 2023-05-01 ENCOUNTER — TELEMEDICINE (OUTPATIENT)
Dept: BEHAVIORAL/MENTAL HEALTH CLINIC | Age: 53
End: 2023-05-01
Payer: COMMERCIAL

## 2023-05-01 DIAGNOSIS — F41.1 GENERALIZED ANXIETY DISORDER: ICD-10-CM

## 2023-05-01 DIAGNOSIS — F33.1 MAJOR DEPRESSIVE DISORDER, RECURRENT EPISODE, MODERATE (HCC): Primary | ICD-10-CM

## 2023-05-01 PROCEDURE — 90832 PSYTX W PT 30 MINUTES: CPT | Performed by: PSYCHOLOGIST

## 2023-05-15 ENCOUNTER — TELEMEDICINE (OUTPATIENT)
Dept: BEHAVIORAL/MENTAL HEALTH CLINIC | Age: 53
End: 2023-05-15
Payer: COMMERCIAL

## 2023-05-15 DIAGNOSIS — F33.1 MAJOR DEPRESSIVE DISORDER, RECURRENT EPISODE, MODERATE (HCC): Primary | ICD-10-CM

## 2023-05-15 DIAGNOSIS — F41.1 GENERALIZED ANXIETY DISORDER: ICD-10-CM

## 2023-05-15 PROCEDURE — 90832 PSYTX W PT 30 MINUTES: CPT | Performed by: PSYCHOLOGIST

## 2023-05-15 ASSESSMENT — ANXIETY QUESTIONNAIRES
5. BEING SO RESTLESS THAT IT IS HARD TO SIT STILL: 0
3. WORRYING TOO MUCH ABOUT DIFFERENT THINGS: 0
7. FEELING AFRAID AS IF SOMETHING AWFUL MIGHT HAPPEN: 3
1. FEELING NERVOUS, ANXIOUS, OR ON EDGE: 3
6. BECOMING EASILY ANNOYED OR IRRITABLE: 1
2. NOT BEING ABLE TO STOP OR CONTROL WORRYING: 0
4. TROUBLE RELAXING: 0
GAD7 TOTAL SCORE: 7

## 2023-05-15 ASSESSMENT — PATIENT HEALTH QUESTIONNAIRE - PHQ9
9. THOUGHTS THAT YOU WOULD BE BETTER OFF DEAD, OR OF HURTING YOURSELF: 0
SUM OF ALL RESPONSES TO PHQ9 QUESTIONS 1 & 2: 0
3. TROUBLE FALLING OR STAYING ASLEEP: 2
7. TROUBLE CONCENTRATING ON THINGS, SUCH AS READING THE NEWSPAPER OR WATCHING TELEVISION: 1
6. FEELING BAD ABOUT YOURSELF - OR THAT YOU ARE A FAILURE OR HAVE LET YOURSELF OR YOUR FAMILY DOWN: 1
SUM OF ALL RESPONSES TO PHQ QUESTIONS 1-9: 7
SUM OF ALL RESPONSES TO PHQ QUESTIONS 1-9: 7
1. LITTLE INTEREST OR PLEASURE IN DOING THINGS: 0
SUM OF ALL RESPONSES TO PHQ QUESTIONS 1-9: 7
5. POOR APPETITE OR OVEREATING: 0
10. IF YOU CHECKED OFF ANY PROBLEMS, HOW DIFFICULT HAVE THESE PROBLEMS MADE IT FOR YOU TO DO YOUR WORK, TAKE CARE OF THINGS AT HOME, OR GET ALONG WITH OTHER PEOPLE: 0
2. FEELING DOWN, DEPRESSED OR HOPELESS: 0
8. MOVING OR SPEAKING SO SLOWLY THAT OTHER PEOPLE COULD HAVE NOTICED. OR THE OPPOSITE, BEING SO FIGETY OR RESTLESS THAT YOU HAVE BEEN MOVING AROUND A LOT MORE THAN USUAL: 0
SUM OF ALL RESPONSES TO PHQ QUESTIONS 1-9: 7
4. FEELING TIRED OR HAVING LITTLE ENERGY: 3

## 2023-08-09 ENCOUNTER — TELEPHONE (OUTPATIENT)
Dept: BEHAVIORAL/MENTAL HEALTH CLINIC | Age: 53
End: 2023-08-09

## 2023-08-09 DIAGNOSIS — F33.1 MAJOR DEPRESSIVE DISORDER, RECURRENT EPISODE, MODERATE (HCC): Primary | ICD-10-CM

## 2023-09-18 ENCOUNTER — TELEMEDICINE (OUTPATIENT)
Dept: BEHAVIORAL/MENTAL HEALTH CLINIC | Age: 53
End: 2023-09-18
Payer: COMMERCIAL

## 2023-09-18 DIAGNOSIS — F41.1 GENERALIZED ANXIETY DISORDER: ICD-10-CM

## 2023-09-18 DIAGNOSIS — F33.1 MAJOR DEPRESSIVE DISORDER, RECURRENT EPISODE, MODERATE (HCC): Primary | ICD-10-CM

## 2023-09-18 PROCEDURE — 90834 PSYTX W PT 45 MINUTES: CPT | Performed by: PSYCHOLOGIST

## 2023-09-18 NOTE — PROGRESS NOTES
limits        ASSESSMENT  Shiva Salcedo presented to the appointment today for evaluation and treatment of symptoms of anxiety and depression. She is currently deemed no risk to herself or others and meets criteria for moderate depressive disorder, recurrent episode, moderate and generalized anxiety disorder. Juliet Angeles was in agreement with recommendations. 5/15/2023     3:14 PM 4/10/2023     4:01 PM 3/22/2023     4:12 PM 2/7/2023     8:39 AM 1/6/2022    10:43 AM 9/28/2021     9:33 AM 1/5/2021     3:10 PM   PHQ Scores   PHQ2 Score 0 2 3 2 4 3 4   PHQ9 Score 7 4 12 9 13 15 15     Interpretation of Total Score Depression Severity: 1-4 = Minimal depression, 5-9 = Mild depression, 10-14 = Moderate depression, 15-19 = Moderately severe depression, 20-27 = Severe depression    How often pt has had thoughts of death or hurting self (if PHQ positive for depression):           5/15/2023     3:00 PM 4/10/2023     4:00 PM 2/7/2023    11:35 AM   MELANIE 7 SCORE   MELANIE-7 Total Score 7 11 7     Interpretation of MELANIE-7 score: 5-9 = mild anxiety, 10-14 = moderate anxiety, 15+ = severe anxiety. Recommend referral to behavioral health for scores 10 or greater. DIAGNOSIS  Juliet Angeles was seen today for anxiety, depression and stress. Diagnoses and all orders for this visit:    Major depressive disorder, recurrent episode, moderate (HCC)    Generalized anxiety disorder          INTERVENTION  Provided education, Discussed self-care (sleep, nutrition, rewarding activities, social support, exercise), Discussed potential barriers to change, Established rapport, Conducted functional assessment, and Supportive techniques      PLAN  Pt will complete one activity of self care  Pt will follow up with [facility] on October 2nd at 4:00 pm via telehealth. INTERACTIVE COMPLEXITY  Is interactive complexity present?   No  Reason:   N/A  Additional Supporting Information:  N/A     Electronically signed by Jerri Curiel on 9/18/23 at 4:13 PM

## 2023-12-04 ENCOUNTER — TELEPHONE (OUTPATIENT)
Dept: FAMILY MEDICINE CLINIC | Age: 53
End: 2023-12-04

## 2023-12-04 NOTE — TELEPHONE ENCOUNTER
PATIENT STATED SHE HAS FMLA FORMS TO BE FILLED OUT. WILL YOU NEED TO SEE HER SOONER FOR THESE FORMS OR IS SHE ABLE TO HAVE THESE FILLED OUT PRIOR TO HER APPOINTMENT WITH YOU ON  01/08/2024?

## 2024-01-08 ENCOUNTER — TELEMEDICINE (OUTPATIENT)
Dept: BEHAVIORAL/MENTAL HEALTH CLINIC | Age: 54
End: 2024-01-08
Payer: COMMERCIAL

## 2024-01-08 DIAGNOSIS — F33.1 MAJOR DEPRESSIVE DISORDER, RECURRENT EPISODE, MODERATE (HCC): Primary | ICD-10-CM

## 2024-01-08 DIAGNOSIS — F41.1 GENERALIZED ANXIETY DISORDER: ICD-10-CM

## 2024-01-08 PROCEDURE — 90837 PSYTX W PT 60 MINUTES: CPT | Performed by: PSYCHOLOGIST

## 2024-02-12 ENCOUNTER — TELEMEDICINE (OUTPATIENT)
Dept: BEHAVIORAL/MENTAL HEALTH CLINIC | Age: 54
End: 2024-02-12
Payer: COMMERCIAL

## 2024-02-12 DIAGNOSIS — F33.1 MAJOR DEPRESSIVE DISORDER, RECURRENT EPISODE, MODERATE (HCC): Primary | ICD-10-CM

## 2024-02-12 DIAGNOSIS — F41.1 GENERALIZED ANXIETY DISORDER: ICD-10-CM

## 2024-02-12 PROCEDURE — 90834 PSYTX W PT 45 MINUTES: CPT | Performed by: PSYCHOLOGIST

## 2024-02-12 NOTE — PROGRESS NOTES
ADULT BEHAVIORAL HEALTH FOLLOW UP  Jennifer Gandara M.A.  Psychology Doctoral Trainee    Supervising Clinical Psychologists:  Tamar Arriola, Ph.D. [place] License 7427  Lexy Benjamin,  Ph.D. [place] License 7464          Visit Date: 2/12/2024   Time of appointment:  4:00 p -  4:44 p   Time spent with Patient: 44 minutes.   This is patient's  26th  appointment.    Reason for Consult:  Anxiety, Depression, and Stress     Referring Provider/PCP:    No ref. provider found  Matheus Hand MD      Pt provided informed consent for the behavioral health program. Discussed with patient model of service to include the limits of confidentiality (i.e. abuse reporting, suicide intervention, etc.) and short-term intervention focused approach.  Pt indicated understanding.  Priya Dubois, was evaluated through a synchronous (real-time) audio-video encounter. The patient (or guardian if applicable) is aware that this is a billable service, which includes applicable co-pays. This Virtual Visit was conducted with patient's (and/or legal guardian's) consent. Patient identification was verified, and a caregiver was present when appropriate.   The patient was located at Home: [address] [address]  Provider was located at Facility (Appt Dept): [address]  [address]  [place] [address]         Total time spent for this encounter:  44 minutes    --Jennifer Gandara on 2/12/2024 at 4:45 PM    An electronic signature was used to authenticate this note.    ASHLEY Powers is a 53 y.o. female who presents for follow up of depression, stress, and anxiety    Previous Recommendations:   Pt will complete one activity of self-care before next appointment.   Pt will fax LA paperwork to office to complete  Pt will follow up with [facility] on January 29, 2024 at 4:00 p     Priya noted that she is still feeling stress about her finances. She noted that she is going to file for bankruptcy in February to start fresh. In addition,

## 2024-03-04 ENCOUNTER — TELEPHONE (OUTPATIENT)
Dept: BEHAVIORAL/MENTAL HEALTH CLINIC | Age: 54
End: 2024-03-04

## 2024-03-25 ENCOUNTER — TELEMEDICINE (OUTPATIENT)
Dept: BEHAVIORAL/MENTAL HEALTH CLINIC | Age: 54
End: 2024-03-25
Payer: COMMERCIAL

## 2024-03-25 DIAGNOSIS — F41.1 GENERALIZED ANXIETY DISORDER: ICD-10-CM

## 2024-03-25 DIAGNOSIS — F33.1 MAJOR DEPRESSIVE DISORDER, RECURRENT EPISODE, MODERATE (HCC): Primary | ICD-10-CM

## 2024-03-25 PROCEDURE — 90832 PSYTX W PT 30 MINUTES: CPT | Performed by: PSYCHOLOGIST

## 2024-03-25 ASSESSMENT — PATIENT HEALTH QUESTIONNAIRE - PHQ9
4. FEELING TIRED OR HAVING LITTLE ENERGY: SEVERAL DAYS
5. POOR APPETITE OR OVEREATING: SEVERAL DAYS
6. FEELING BAD ABOUT YOURSELF - OR THAT YOU ARE A FAILURE OR HAVE LET YOURSELF OR YOUR FAMILY DOWN: SEVERAL DAYS
9. THOUGHTS THAT YOU WOULD BE BETTER OFF DEAD, OR OF HURTING YOURSELF: NOT AT ALL
3. TROUBLE FALLING OR STAYING ASLEEP: NEARLY EVERY DAY
SUM OF ALL RESPONSES TO PHQ QUESTIONS 1-9: 13
7. TROUBLE CONCENTRATING ON THINGS, SUCH AS READING THE NEWSPAPER OR WATCHING TELEVISION: NEARLY EVERY DAY
8. MOVING OR SPEAKING SO SLOWLY THAT OTHER PEOPLE COULD HAVE NOTICED. OR THE OPPOSITE, BEING SO FIGETY OR RESTLESS THAT YOU HAVE BEEN MOVING AROUND A LOT MORE THAN USUAL: MORE THAN HALF THE DAYS
10. IF YOU CHECKED OFF ANY PROBLEMS, HOW DIFFICULT HAVE THESE PROBLEMS MADE IT FOR YOU TO DO YOUR WORK, TAKE CARE OF THINGS AT HOME, OR GET ALONG WITH OTHER PEOPLE: VERY DIFFICULT
1. LITTLE INTEREST OR PLEASURE IN DOING THINGS: SEVERAL DAYS
SUM OF ALL RESPONSES TO PHQ9 QUESTIONS 1 & 2: 2
2. FEELING DOWN, DEPRESSED OR HOPELESS: SEVERAL DAYS
SUM OF ALL RESPONSES TO PHQ QUESTIONS 1-9: 13

## 2024-03-25 NOTE — PROGRESS NOTES
ADULT BEHAVIORAL HEALTH FOLLOW UP  Jennifer Gandara M.A.  Psychology Doctoral Trainee    Supervising Clinical Psychologists:  Tamar Arriola, Ph.D. [place] License 7427  Lexy Benjamin,  Ph.D. [place] License 7464          Visit Date: 3/25/2024   Time of appointment:  3:45 p - 4:15 p    Time spent with Patient: 30 minutes.   This is patient's  27th  appointment.    Reason for Consult:  Depression, Stress, and Anxiety     Referring Provider/PCP:    No ref. provider found  Matheus Hand MD      Pt provided informed consent for the behavioral health program. Discussed with patient model of service to include the limits of confidentiality (i.e. abuse reporting, suicide intervention, etc.) and short-term intervention focused approach.  Pt indicated understanding.    Priya Dubois, was evaluated through a synchronous (real-time) audio-video encounter. The patient (or guardian if applicable) is aware that this is a billable service, which includes applicable co-pays. This Virtual Visit was conducted with patient's (and/or legal guardian's) consent. Patient identification was verified, and a caregiver was present when appropriate.   The patient was located at Home: [address] [address]  Provider was located at Facility (Appt Dept): [address] [facility] [address] [facility]  [address]  [place] [address]         Total time spent for this encounter:  30 minutes    --Jennifer Gandara on 3/27/2024 at 8:07 AM    An electronic signature was used to authenticate this note.    ASHLEY Powers is a 53 y.o. female who presents for follow up of anxiety, depression, and stress.    Previous Recommendations:   Pt will follow up with [facility] on March 4, 2024 at 4:00 p     Priya noted that she has started to feel better since her paychecks were not being garnished; however, she got another bill in the mail saying that she owes 1,000 dollars to the credit company for the interest of her loans. She noted that she has started to not worry about this

## 2024-04-08 ENCOUNTER — TELEPHONE (OUTPATIENT)
Dept: BEHAVIORAL/MENTAL HEALTH CLINIC | Age: 54
End: 2024-04-08

## 2024-06-14 ENCOUNTER — TELEMEDICINE (OUTPATIENT)
Dept: BEHAVIORAL/MENTAL HEALTH CLINIC | Age: 54
End: 2024-06-14
Payer: COMMERCIAL

## 2024-06-14 DIAGNOSIS — F33.1 MAJOR DEPRESSIVE DISORDER, RECURRENT EPISODE, MODERATE (HCC): Primary | ICD-10-CM

## 2024-06-14 DIAGNOSIS — F41.1 GAD (GENERALIZED ANXIETY DISORDER): ICD-10-CM

## 2024-06-14 PROCEDURE — 90832 PSYTX W PT 30 MINUTES: CPT | Performed by: SOCIAL WORKER

## 2024-06-14 NOTE — PROGRESS NOTES
ADULT BEHAVIORAL HEALTH FOLLOW UP  SHABANA Sanchez  Licensed Independent          Visit Date: 6/14/2024   Time of appointment: 8:05 AM   Time spent with Patient: 26 minutes.   This is patient's first appointment.    Reason for Consult:  Anxiety and Depression     PCP:  Matheus Hand MD      Pt provided informed consent for the behavioral health program. Discussed with patient model of service to include the limits of confidentiality (i.e. abuse reporting, suicide intervention, etc.) and short-term intervention focused approach.  Pt indicated understanding.    ASHLEY Powers is a 53 y.o. female who presents for follow up of depression and anxiety.     Priya reported she was in a dark place, trying to stay positive because she can have a lot on her mind. She states her depression worsened about 1 year ago. Pt reported she has custody of her niece and also has a son. Pt reported she has been worried about finances. She is working full-time and part-time. She works in factory and cleaning offices. She likes to play in the yard and [employer]. Pt states energy has been ok. She reports sleep is all over the place, wake up several times/night. She finds stuff to worry about. She reported she has panic attacks couple times/week. She has anxiety all the time. Pt reported she has been trying to stay busy. She identified the following triggers for her anxiety; son, grandkids, worries about what kids will do when she is not here, and playing scenarios in her head. She has been having nightmares about people being shot in the middle of the streets. She has been having nightmares for the last 2 weeks. She gets exercise through work with cleaning.     Previous Recommendations:   Pt was previously meeting with a psychology doctoral student on the [facility] team, Jennifer.     MENTAL STATUS EXAM  Mood was within normal limits with anxious affect.   Suicidal ideation was denied. She denied SI, however she admitted

## 2024-07-02 ENCOUNTER — TELEMEDICINE (OUTPATIENT)
Dept: BEHAVIORAL/MENTAL HEALTH CLINIC | Age: 54
End: 2024-07-02
Payer: COMMERCIAL

## 2024-07-02 DIAGNOSIS — F33.1 MAJOR DEPRESSIVE DISORDER, RECURRENT EPISODE, MODERATE (HCC): Primary | ICD-10-CM

## 2024-07-02 DIAGNOSIS — F41.1 GAD (GENERALIZED ANXIETY DISORDER): ICD-10-CM

## 2024-07-02 PROCEDURE — 90832 PSYTX W PT 30 MINUTES: CPT | Performed by: SOCIAL WORKER

## 2024-07-02 NOTE — PROGRESS NOTES
7/10/2024 at 12:29 PM    An electronic signature was used to authenticate this note.      INTERACTIVE COMPLEXITY  Is interactive complexity present?  No  Reason:  N/A  Additional Supporting Information:  N/A       Electronically signed by SHABANA Sanchez on 7/10/2024 at 12:29 PM

## 2024-10-17 NOTE — PROGRESS NOTES
ADULT BEHAVIORAL HEALTH FOLLOW UP  Jennifer Gandara M.A.  Psychology Doctoral Trainee    Supervising Clinical Psychologists:  Tamar Arriola, Ph.D. [place] License 7427  Lexy Benjamin,  Ph.D. [place] License 7464          Visit Date: 1/8/2024   Time of appointment:  3:00 p -  3:54 p   Time spent with Patient: 54 minutes.   This is patient's  25th  appointment.    Reason for Consult:  Depression, Stress, and Anxiety     Referring Provider/PCP:    No ref. provider found  Matheus Hand MD      Pt provided informed consent for the behavioral health program. Discussed with patient model of service to include the limits of confidentiality (i.e. abuse reporting, suicide intervention, etc.) and short-term intervention focused approach.  Pt indicated understanding.    Priya Dubois, was evaluated through a synchronous (real-time) audio-video encounter. The patient (or guardian if applicable) is aware that this is a billable service, which includes applicable co-pays. This Virtual Visit was conducted with patient's (and/or legal guardian's) consent. Patient identification was verified, and a caregiver was present when appropriate.   The patient was located at Other: [place], [place].   Provider was located at Facility (Appt Dept): [address] [place] [address]         Total time spent for this encounter:  54 minutes    --Jennifer Gandara on 1/8/2024 at 3:54 PM    An electronic signature was used to authenticate this note.    ASHLEY Powers is a 53 y.o. female who presents for follow up of anxiety and depression.     Previous Recommendations:   Pt will complete one activity of self care  Pt will follow up with [facility] on October 2nd at 4:00 pm via telehealth    Priya noted that her depression had significantly decreased for a time and is now feeling it come back. She noted that she has started to over think scenarios in her head and feel down more often. She noted that she let her FMLA  Pt BIBA from [facility] for low hgb 6.5

## 2025-01-28 ENCOUNTER — TELEPHONE (OUTPATIENT)
Dept: BEHAVIORAL/MENTAL HEALTH CLINIC | Age: 55
End: 2025-01-28

## 2025-01-28 NOTE — TELEPHONE ENCOUNTER
CAN YOU REACH OUT TO PATIENT FOR APPOINTMENT? UNSURE IF NEEDED A REFERRAL AGAIN, LOOKS LIKE LAST APPOINTMENT 07/24/2024 AND LAST APPOINTMENT SCHEDULED WAS A NO SHOW 08/2024

## 2025-01-29 ENCOUNTER — TELEPHONE (OUTPATIENT)
Dept: BEHAVIORAL/MENTAL HEALTH CLINIC | Age: 55
End: 2025-01-29

## 2025-08-27 ENCOUNTER — TELEMEDICINE (OUTPATIENT)
Dept: BEHAVIORAL/MENTAL HEALTH CLINIC | Age: 55
End: 2025-08-27
Payer: COMMERCIAL

## 2025-08-27 DIAGNOSIS — F41.1 GAD (GENERALIZED ANXIETY DISORDER): ICD-10-CM

## 2025-08-27 DIAGNOSIS — F33.1 MAJOR DEPRESSIVE DISORDER, RECURRENT EPISODE, MODERATE (HCC): Primary | ICD-10-CM

## 2025-08-27 PROCEDURE — 90791 PSYCH DIAGNOSTIC EVALUATION: CPT | Performed by: PSYCHOLOGIST

## 2025-08-27 ASSESSMENT — ANXIETY QUESTIONNAIRES
1. FEELING NERVOUS, ANXIOUS, OR ON EDGE: NEARLY EVERY DAY
5. BEING SO RESTLESS THAT IT IS HARD TO SIT STILL: NEARLY EVERY DAY
4. TROUBLE RELAXING: NEARLY EVERY DAY
1. FEELING NERVOUS, ANXIOUS, OR ON EDGE: NEARLY EVERY DAY
2. NOT BEING ABLE TO STOP OR CONTROL WORRYING: NEARLY EVERY DAY
7. FEELING AFRAID AS IF SOMETHING AWFUL MIGHT HAPPEN: NEARLY EVERY DAY
3. WORRYING TOO MUCH ABOUT DIFFERENT THINGS: NEARLY EVERY DAY
IF YOU CHECKED OFF ANY PROBLEMS ON THIS QUESTIONNAIRE, HOW DIFFICULT HAVE THESE PROBLEMS MADE IT FOR YOU TO DO YOUR WORK, TAKE CARE OF THINGS AT HOME, OR GET ALONG WITH OTHER PEOPLE: VERY DIFFICULT
3. WORRYING TOO MUCH ABOUT DIFFERENT THINGS: NEARLY EVERY DAY
7. FEELING AFRAID AS IF SOMETHING AWFUL MIGHT HAPPEN: NEARLY EVERY DAY
GAD7 TOTAL SCORE: 21
6. BECOMING EASILY ANNOYED OR IRRITABLE: NEARLY EVERY DAY
6. BECOMING EASILY ANNOYED OR IRRITABLE: NEARLY EVERY DAY
IF YOU CHECKED OFF ANY PROBLEMS ON THIS QUESTIONNAIRE, HOW DIFFICULT HAVE THESE PROBLEMS MADE IT FOR YOU TO DO YOUR WORK, TAKE CARE OF THINGS AT HOME, OR GET ALONG WITH OTHER PEOPLE: VERY DIFFICULT
2. NOT BEING ABLE TO STOP OR CONTROL WORRYING: NEARLY EVERY DAY
4. TROUBLE RELAXING: NEARLY EVERY DAY
5. BEING SO RESTLESS THAT IT IS HARD TO SIT STILL: NEARLY EVERY DAY

## 2025-08-27 ASSESSMENT — PATIENT HEALTH QUESTIONNAIRE - PHQ9
3. TROUBLE FALLING OR STAYING ASLEEP: MORE THAN HALF THE DAYS
3. TROUBLE FALLING OR STAYING ASLEEP: MORE THAN HALF THE DAYS
2. FEELING DOWN, DEPRESSED OR HOPELESS: MORE THAN HALF THE DAYS
8. MOVING OR SPEAKING SO SLOWLY THAT OTHER PEOPLE COULD HAVE NOTICED. OR THE OPPOSITE - BEING SO FIDGETY OR RESTLESS THAT YOU HAVE BEEN MOVING AROUND A LOT MORE THAN USUAL: NOT AT ALL
5. POOR APPETITE OR OVEREATING: SEVERAL DAYS
6. FEELING BAD ABOUT YOURSELF - OR THAT YOU ARE A FAILURE OR HAVE LET YOURSELF OR YOUR FAMILY DOWN: MORE THAN HALF THE DAYS
10. IF YOU CHECKED OFF ANY PROBLEMS, HOW DIFFICULT HAVE THESE PROBLEMS MADE IT FOR YOU TO DO YOUR WORK, TAKE CARE OF THINGS AT HOME, OR GET ALONG WITH OTHER PEOPLE: VERY DIFFICULT
1. LITTLE INTEREST OR PLEASURE IN DOING THINGS: MORE THAN HALF THE DAYS
5. POOR APPETITE OR OVEREATING: SEVERAL DAYS
SUM OF ALL RESPONSES TO PHQ QUESTIONS 1-9: 13
9. THOUGHTS THAT YOU WOULD BE BETTER OFF DEAD, OR OF HURTING YOURSELF: NOT AT ALL
4. FEELING TIRED OR HAVING LITTLE ENERGY: MORE THAN HALF THE DAYS
1. LITTLE INTEREST OR PLEASURE IN DOING THINGS: MORE THAN HALF THE DAYS
7. TROUBLE CONCENTRATING ON THINGS, SUCH AS READING THE NEWSPAPER OR WATCHING TELEVISION: MORE THAN HALF THE DAYS
9. THOUGHTS THAT YOU WOULD BE BETTER OFF DEAD, OR OF HURTING YOURSELF: NOT AT ALL
SUM OF ALL RESPONSES TO PHQ QUESTIONS 1-9: 13
2. FEELING DOWN, DEPRESSED OR HOPELESS: MORE THAN HALF THE DAYS
8. MOVING OR SPEAKING SO SLOWLY THAT OTHER PEOPLE COULD HAVE NOTICED. OR THE OPPOSITE, BEING SO FIGETY OR RESTLESS THAT YOU HAVE BEEN MOVING AROUND A LOT MORE THAN USUAL: NOT AT ALL
10. IF YOU CHECKED OFF ANY PROBLEMS, HOW DIFFICULT HAVE THESE PROBLEMS MADE IT FOR YOU TO DO YOUR WORK, TAKE CARE OF THINGS AT HOME, OR GET ALONG WITH OTHER PEOPLE: VERY DIFFICULT
4. FEELING TIRED OR HAVING LITTLE ENERGY: MORE THAN HALF THE DAYS
6. FEELING BAD ABOUT YOURSELF - OR THAT YOU ARE A FAILURE OR HAVE LET YOURSELF OR YOUR FAMILY DOWN: MORE THAN HALF THE DAYS
SUM OF ALL RESPONSES TO PHQ QUESTIONS 1-9: 13
7. TROUBLE CONCENTRATING ON THINGS, SUCH AS READING THE NEWSPAPER OR WATCHING TELEVISION: MORE THAN HALF THE DAYS

## 2025-08-27 ASSESSMENT — LIFESTYLE VARIABLES
HISTORY_ALCOHOL_USE: NO
PAST THREE MONTHS WHAT IS THE LARGEST AMOUNT OF ALCOHOLIC DRINKS YOU HAVE CONSUMED IN ONE DAY: 3
ALCOHOL_DAYS_PER_WEEK: 3